# Patient Record
Sex: MALE | Race: WHITE | NOT HISPANIC OR LATINO | Employment: FULL TIME | URBAN - NONMETROPOLITAN AREA
[De-identification: names, ages, dates, MRNs, and addresses within clinical notes are randomized per-mention and may not be internally consistent; named-entity substitution may affect disease eponyms.]

---

## 2023-05-30 DIAGNOSIS — E78.00 HYPERCHOLESTEROLEMIA: ICD-10-CM

## 2023-05-30 PROBLEM — N20.1 URETER, CALCULUS: Status: ACTIVE | Noted: 2023-05-30

## 2023-05-30 PROBLEM — I25.10 CORONARY ARTERIOSCLEROSIS: Status: ACTIVE | Noted: 2023-05-30

## 2023-05-30 PROBLEM — I10 BENIGN HYPERTENSION: Status: ACTIVE | Noted: 2023-05-30

## 2023-05-30 PROBLEM — K21.00 GASTROESOPHAGEAL REFLUX DISEASE WITH ESOPHAGITIS WITHOUT HEMORRHAGE: Status: ACTIVE | Noted: 2023-05-30

## 2023-05-30 RX ORDER — ATORVASTATIN CALCIUM 40 MG/1
40 TABLET, FILM COATED ORAL DAILY
Qty: 90 TABLET | Refills: 0 | Status: SHIPPED | OUTPATIENT
Start: 2023-05-30 | End: 2023-08-30 | Stop reason: SDUPTHER

## 2023-05-30 RX ORDER — AMLODIPINE BESYLATE 10 MG/1
10 TABLET ORAL DAILY
COMMUNITY
End: 2023-07-05 | Stop reason: SDUPTHER

## 2023-05-30 RX ORDER — ATORVASTATIN CALCIUM 40 MG/1
40 TABLET, FILM COATED ORAL DAILY
COMMUNITY
End: 2023-05-30 | Stop reason: SDUPTHER

## 2023-05-30 RX ORDER — LOSARTAN POTASSIUM 50 MG/1
50 TABLET ORAL DAILY
COMMUNITY
End: 2023-07-05 | Stop reason: SDUPTHER

## 2023-07-05 DIAGNOSIS — I10 BENIGN HYPERTENSION: ICD-10-CM

## 2023-07-05 RX ORDER — AMLODIPINE BESYLATE 10 MG/1
10 TABLET ORAL DAILY
Qty: 180 TABLET | Refills: 0 | Status: SHIPPED | OUTPATIENT
Start: 2023-07-05 | End: 2023-12-29 | Stop reason: SDUPTHER

## 2023-07-05 RX ORDER — LOSARTAN POTASSIUM 50 MG/1
50 TABLET ORAL DAILY
Qty: 90 TABLET | Refills: 0 | Status: SHIPPED | OUTPATIENT
Start: 2023-07-05 | End: 2023-10-04 | Stop reason: SDUPTHER

## 2023-07-18 ENCOUNTER — OFFICE VISIT (OUTPATIENT)
Dept: PRIMARY CARE | Facility: CLINIC | Age: 58
End: 2023-07-18
Payer: COMMERCIAL

## 2023-07-18 VITALS
OXYGEN SATURATION: 97 % | BODY MASS INDEX: 28.73 KG/M2 | WEIGHT: 216.8 LBS | HEART RATE: 79 BPM | DIASTOLIC BLOOD PRESSURE: 80 MMHG | TEMPERATURE: 97.6 F | SYSTOLIC BLOOD PRESSURE: 128 MMHG | HEIGHT: 73 IN

## 2023-07-18 DIAGNOSIS — I10 BENIGN HYPERTENSION: ICD-10-CM

## 2023-07-18 DIAGNOSIS — E78.00 HYPERCHOLESTEROLEMIA: ICD-10-CM

## 2023-07-18 DIAGNOSIS — M77.8 DELTOID TENDINITIS OF RIGHT SHOULDER: ICD-10-CM

## 2023-07-18 DIAGNOSIS — M75.21 BICEPS TENDINITIS OF RIGHT UPPER EXTREMITY: Primary | ICD-10-CM

## 2023-07-18 PROCEDURE — 99214 OFFICE O/P EST MOD 30 MIN: CPT | Performed by: INTERNAL MEDICINE

## 2023-07-18 PROCEDURE — 3079F DIAST BP 80-89 MM HG: CPT | Performed by: INTERNAL MEDICINE

## 2023-07-18 PROCEDURE — 1036F TOBACCO NON-USER: CPT | Performed by: INTERNAL MEDICINE

## 2023-07-18 PROCEDURE — 3074F SYST BP LT 130 MM HG: CPT | Performed by: INTERNAL MEDICINE

## 2023-07-18 PROCEDURE — 20550 NJX 1 TENDON SHEATH/LIGAMENT: CPT | Performed by: INTERNAL MEDICINE

## 2023-07-18 RX ORDER — TRIAMCINOLONE ACETONIDE 40 MG/ML
40 INJECTION, SUSPENSION INTRA-ARTICULAR; INTRAMUSCULAR ONCE
Status: COMPLETED | OUTPATIENT
Start: 2023-07-18 | End: 2023-07-18

## 2023-07-18 RX ADMIN — TRIAMCINOLONE ACETONIDE 40 MG: 40 INJECTION, SUSPENSION INTRA-ARTICULAR; INTRAMUSCULAR at 16:21

## 2023-07-18 RX ADMIN — TRIAMCINOLONE ACETONIDE 40 MG: 40 INJECTION, SUSPENSION INTRA-ARTICULAR; INTRAMUSCULAR at 16:23

## 2023-07-18 ASSESSMENT — ENCOUNTER SYMPTOMS
FATIGUE: 0
BLOOD IN STOOL: 0
DEPRESSION: 0
ARTHRALGIAS: 1
HYPERTENSION: 1
LOSS OF SENSATION IN FEET: 0
PALPITATIONS: 0
DIARRHEA: 0
COUGH: 0
UNEXPECTED WEIGHT CHANGE: 0
BRUISES/BLEEDS EASILY: 0
DIFFICULTY URINATING: 0
FEVER: 0
PAIN: 1
SINUS PAIN: 0
MYALGIAS: 1
WHEEZING: 0
ABDOMINAL PAIN: 0
DIZZINESS: 0
HEADACHES: 0
SORE THROAT: 0
OCCASIONAL FEELINGS OF UNSTEADINESS: 0

## 2023-07-18 ASSESSMENT — ANXIETY QUESTIONNAIRES
1. FEELING NERVOUS, ANXIOUS, OR ON EDGE: NOT AT ALL
2. NOT BEING ABLE TO STOP OR CONTROL WORRYING: NOT AT ALL
7. FEELING AFRAID AS IF SOMETHING AWFUL MIGHT HAPPEN: NOT AT ALL
3. WORRYING TOO MUCH ABOUT DIFFERENT THINGS: NOT AT ALL
GAD7 TOTAL SCORE: 0
4. TROUBLE RELAXING: NOT AT ALL
5. BEING SO RESTLESS THAT IT IS HARD TO SIT STILL: NOT AT ALL
6. BECOMING EASILY ANNOYED OR IRRITABLE: NOT AT ALL

## 2023-07-18 ASSESSMENT — PATIENT HEALTH QUESTIONNAIRE - PHQ9
SUM OF ALL RESPONSES TO PHQ9 QUESTIONS 1 AND 2: 0
2. FEELING DOWN, DEPRESSED OR HOPELESS: NOT AT ALL
1. LITTLE INTEREST OR PLEASURE IN DOING THINGS: NOT AT ALL

## 2023-07-18 NOTE — PROGRESS NOTES
"Subjective   Patient ID: Av Francisco is a 58 y.o. male who presents for Annual Exam, Hyperlipidemia, Hypertension, GERD, Pain (Rt buttock to foot with numbness, especially when driving ), Shoulder Pain (Rt shoulder, requests cortisone inj), and lesion on side of face (Right side).    --Right-sided facial skin lesion, skin keratosis patient reassured continue monitoring call if any increase in size or worsening  -Right shoulder pain recurrent patient had previously steroid injection which helped with his pain  Right bicipital tendinitis steroid injection today  Right deltoid tendinitis, steroid injection today follow-up results  - Hyperlipidemia improving  -Cardiac calcium scoring mildly calcium. Maximize medical management  Weight loss exercise patient denies any chest pain or shortness of breath continue with aggressive medical management  -\"Reflux disease continue with conservative measures continue current treatment  -Hypertension controlled continue current medication  -Continue exercise and weight loss  Follow-up 6 months       Hyperlipidemia  Associated symptoms include myalgias. Pertinent negatives include no chest pain.   Hypertension  Pertinent negatives include no chest pain, headaches or palpitations.   GERD  He reports no abdominal pain, no chest pain, no coughing, no sore throat or no wheezing. Pertinent negatives include no fatigue.   Pain  Pertinent negatives include no abdominal pain, chest pain, diarrhea, fatigue, fever, headaches, rash or wheezing.   Shoulder Pain   Pertinent negatives include no fever.          Review of Systems   Constitutional:  Negative for fatigue, fever and unexpected weight change.   HENT:  Negative for congestion, ear discharge, ear pain, mouth sores, sinus pain and sore throat.    Eyes:  Negative for visual disturbance.   Respiratory:  Negative for cough and wheezing.    Cardiovascular:  Negative for chest pain, palpitations and leg swelling.   Gastrointestinal:  " "Negative for abdominal pain, blood in stool and diarrhea.   Genitourinary:  Negative for difficulty urinating.   Musculoskeletal:  Positive for arthralgias and myalgias.   Skin:  Negative for rash.   Neurological:  Negative for dizziness and headaches.   Hematological:  Does not bruise/bleed easily.   Psychiatric/Behavioral:  Negative for behavioral problems.    All other systems reviewed and are negative.      Objective   No results found for: \"HGBA1C\"   /80   Pulse 79   Temp 36.4 °C (97.6 °F)   Ht 1.854 m (6' 1\")   Wt 98.3 kg (216 lb 12.8 oz)   SpO2 97%   BMI 28.60 kg/m²   Lab Results   Component Value Date    WBC 7.5 01/04/2023    HGB 16.4 01/04/2023    HCT 48.3 01/04/2023     01/04/2023    CHOL 185 01/04/2023    TRIG 161 (H) 01/04/2023    HDL 52.0 01/04/2023    ALT 34 01/04/2023    AST 22 01/04/2023     01/04/2023    K 3.9 01/04/2023     01/04/2023    CREATININE 0.81 01/04/2023    BUN 12 01/04/2023    CO2 24 01/04/2023    TSH 1.57 01/04/2023     par   Physical Exam  Vitals and nursing note reviewed.   Constitutional:       Appearance: Normal appearance.   HENT:      Head: Normocephalic.      Nose: Nose normal.   Eyes:      Conjunctiva/sclera: Conjunctivae normal.      Pupils: Pupils are equal, round, and reactive to light.   Cardiovascular:      Rate and Rhythm: Regular rhythm.   Pulmonary:      Effort: Pulmonary effort is normal.      Breath sounds: Normal breath sounds.   Abdominal:      General: Abdomen is flat.      Palpations: Abdomen is soft.   Musculoskeletal:         General: Tenderness (Right bicipital tendinitis and right deltoid tendinitis) present.      Cervical back: Neck supple.   Skin:     General: Skin is warm.   Neurological:      General: No focal deficit present.      Mental Status: He is oriented to person, place, and time.   Psychiatric:         Mood and Affect: Mood normal.     Procedure note  1.  Right bicipital tendon injection  After patient verbal consent, " "risks and benefits of procedure explained, including infection bleeding, patient understood  Ethyl alcohol used for sterilization  Ethyl chloride spray used for  local anesthesia  40 mg Kenalog  and 1 cc lidocaine injected to   right bicipital groove           , used  1 inch needle  Patient tolerated procedure,   No complications   Band aid used for dressing  Patient instructed no heavy exertion or exercise for the next 48 hours    2 procedure note  Right deltoid muscle tendon injection  After patient verbal consent, risks and benefits of procedure explained, including infection bleeding, patient understood  Ethyl alcohol used for sterilization  Ethyl chloride spray used for  local anesthesia  40 mg Kenalog  and 1 cc lidocaine injected to   right will-deltoid tendon           , used  1 inch needle  Patient tolerated procedure,   No complications   Band aid used for dressing  Patient instructed no heavy exertion or exercise for the next 48 hours    Assessment/Plan   Av was seen today for annual exam, hyperlipidemia, hypertension, gerd, pain, shoulder pain and lesion on side of face.  Diagnoses and all orders for this visit:  Biceps tendinitis of right upper extremity (Primary)  -     triamcinolone acetonide (Kenalog-40) injection 40 mg  Benign hypertension  Deltoid tendinitis of right shoulder  Hypercholesterolemia   Right-sided facial skin lesion, skin keratosis patient reassured continue monitoring call if any increase in size or worsening  -Right shoulder pain recurrent patient had previously steroid injection which helped with his pain  Right bicipital tendinitis steroid injection today  Right deltoid tendinitis, steroid injection today follow-up results  - Hyperlipidemia improving  -Cardiac calcium scoring mildly calcium. Maximize medical management  Weight loss exercise patient denies any chest pain or shortness of breath continue with aggressive medical management  -\"Reflux disease continue with " conservative measures continue current treatment  -Hypertension controlled continue current medication  -Continue exercise and weight loss  Follow-up 6 months

## 2023-08-30 DIAGNOSIS — E78.00 HYPERCHOLESTEROLEMIA: ICD-10-CM

## 2023-08-30 RX ORDER — ATORVASTATIN CALCIUM 40 MG/1
40 TABLET, FILM COATED ORAL DAILY
Qty: 90 TABLET | Refills: 0 | Status: SHIPPED | OUTPATIENT
Start: 2023-08-30 | End: 2023-11-20 | Stop reason: SDUPTHER

## 2023-10-04 DIAGNOSIS — I10 BENIGN HYPERTENSION: ICD-10-CM

## 2023-10-04 RX ORDER — LOSARTAN POTASSIUM 50 MG/1
50 TABLET ORAL DAILY
Qty: 90 TABLET | Refills: 0 | Status: SHIPPED | OUTPATIENT
Start: 2023-10-04 | End: 2023-12-29 | Stop reason: SDUPTHER

## 2023-11-20 DIAGNOSIS — E78.00 HYPERCHOLESTEROLEMIA: ICD-10-CM

## 2023-11-20 RX ORDER — ATORVASTATIN CALCIUM 40 MG/1
40 TABLET, FILM COATED ORAL DAILY
Qty: 90 TABLET | Refills: 1 | Status: SHIPPED | OUTPATIENT
Start: 2023-11-20 | End: 2024-05-29 | Stop reason: SDUPTHER

## 2023-12-29 DIAGNOSIS — I10 BENIGN HYPERTENSION: ICD-10-CM

## 2023-12-29 RX ORDER — LOSARTAN POTASSIUM 50 MG/1
50 TABLET ORAL DAILY
Qty: 90 TABLET | Refills: 0 | Status: SHIPPED | OUTPATIENT
Start: 2023-12-29 | End: 2024-04-03 | Stop reason: SDUPTHER

## 2023-12-29 RX ORDER — AMLODIPINE BESYLATE 10 MG/1
10 TABLET ORAL DAILY
Qty: 180 TABLET | Refills: 0 | Status: SHIPPED | OUTPATIENT
Start: 2023-12-29 | End: 2024-04-03 | Stop reason: SDUPTHER

## 2024-01-22 ENCOUNTER — OFFICE VISIT (OUTPATIENT)
Dept: PRIMARY CARE | Facility: CLINIC | Age: 59
End: 2024-01-22
Payer: COMMERCIAL

## 2024-01-22 VITALS
DIASTOLIC BLOOD PRESSURE: 74 MMHG | SYSTOLIC BLOOD PRESSURE: 110 MMHG | BODY MASS INDEX: 28.37 KG/M2 | WEIGHT: 215 LBS | TEMPERATURE: 97.9 F | OXYGEN SATURATION: 96 % | HEART RATE: 68 BPM

## 2024-01-22 DIAGNOSIS — H60.393 OTHER INFECTIVE OTITIS EXTERNA OF BOTH EARS, UNSPECIFIED CHRONICITY: Primary | ICD-10-CM

## 2024-01-22 DIAGNOSIS — I10 BENIGN HYPERTENSION: ICD-10-CM

## 2024-01-22 DIAGNOSIS — E78.00 HYPERCHOLESTEROLEMIA: ICD-10-CM

## 2024-01-22 DIAGNOSIS — L98.9 SKIN LESION OF FACE: ICD-10-CM

## 2024-01-22 DIAGNOSIS — Z00.00 ANNUAL PHYSICAL EXAM: ICD-10-CM

## 2024-01-22 PROCEDURE — 3074F SYST BP LT 130 MM HG: CPT | Performed by: INTERNAL MEDICINE

## 2024-01-22 PROCEDURE — 1036F TOBACCO NON-USER: CPT | Performed by: INTERNAL MEDICINE

## 2024-01-22 PROCEDURE — 3078F DIAST BP <80 MM HG: CPT | Performed by: INTERNAL MEDICINE

## 2024-01-22 PROCEDURE — 99214 OFFICE O/P EST MOD 30 MIN: CPT | Performed by: INTERNAL MEDICINE

## 2024-01-22 RX ORDER — NEOMYCIN SULFATE, POLYMYXIN B SULFATE, HYDROCORTISONE 3.5; 10000; 1 MG/ML; [USP'U]/ML; MG/ML
3 SOLUTION/ DROPS AURICULAR (OTIC) 4 TIMES DAILY
Qty: 4.2 ML | Refills: 0 | Status: SHIPPED | OUTPATIENT
Start: 2024-01-22 | End: 2024-01-29

## 2024-01-22 ASSESSMENT — ENCOUNTER SYMPTOMS
ABDOMINAL PAIN: 0
DIZZINESS: 0
PALPITATIONS: 0
SORE THROAT: 0
WHEEZING: 0
DIFFICULTY URINATING: 0
UNEXPECTED WEIGHT CHANGE: 0
HYPERTENSION: 1
BRUISES/BLEEDS EASILY: 0
BLOOD IN STOOL: 0
HEADACHES: 0
ARTHRALGIAS: 0
COLOR CHANGE: 1
DIARRHEA: 0
FEVER: 0
SINUS PAIN: 0
COUGH: 0
FATIGUE: 0

## 2024-01-22 NOTE — PROGRESS NOTES
"Subjective   Patient ID: Av Francisco is a 58 y.o. male who presents for Hyperlipidemia, Hypertension, spots (Spots on head itching), and Ear Fullness (Whatever ear he is laying on in am seems to drain, earache in am when happens).    -Bilateral ear fullness examination bilateral otitis externa  Patient counseled about prevention use Neosporin polymyxin HC patient instructed about the use and side effect follow-up if no improvement  - Right-sided facial skin lesion now itching and getting bigger in size also multiple lesions right arm  - Status post right biceps tendinitis resolved after history of steroid injection  - Hyperlipidemia improving  -Cardiac calcium scoring mildly calcium. Maximize medical management  Weight loss exercise patient denies any chest pain or shortness of breath continue with aggressive medical management  -\"Reflux disease continue with conservative measures continue current treatment  -Hypertension controlled continue current medication  -Continue exercise and weight loss  Follow-up 6 months needs complete blood work before next appointment         Hyperlipidemia  Pertinent negatives include no chest pain.   Hypertension  Pertinent negatives include no chest pain, headaches or palpitations.   Ear Fullness   Associated symptoms include ear discharge. Pertinent negatives include no abdominal pain, coughing, diarrhea, headaches, rash or sore throat.          Review of Systems   Constitutional:  Negative for fatigue, fever and unexpected weight change.   HENT:  Positive for ear discharge. Negative for congestion, ear pain, mouth sores, sinus pain and sore throat.    Eyes:  Negative for visual disturbance.   Respiratory:  Negative for cough and wheezing.    Cardiovascular:  Negative for chest pain, palpitations and leg swelling.   Gastrointestinal:  Negative for abdominal pain, blood in stool and diarrhea.   Genitourinary:  Negative for difficulty urinating.   Musculoskeletal:  Negative for " "arthralgias.   Skin:  Positive for color change. Negative for rash.   Neurological:  Negative for dizziness and headaches.   Hematological:  Does not bruise/bleed easily.   Psychiatric/Behavioral:  Negative for behavioral problems.    All other systems reviewed and are negative.      Objective   No results found for: \"HGBA1C\"   /74   Pulse 68   Temp 36.6 °C (97.9 °F)   Wt 97.5 kg (215 lb)   SpO2 96%   BMI 28.37 kg/m²   Lab Results   Component Value Date    WBC 7.5 01/04/2023    HGB 16.4 01/04/2023    HCT 48.3 01/04/2023     01/04/2023    CHOL 185 01/04/2023    TRIG 161 (H) 01/04/2023    HDL 52.0 01/04/2023    ALT 34 01/04/2023    AST 22 01/04/2023     01/04/2023    K 3.9 01/04/2023     01/04/2023    CREATININE 0.81 01/04/2023    BUN 12 01/04/2023    CO2 24 01/04/2023    TSH 1.57 01/04/2023     par   Physical Exam  Vitals and nursing note reviewed.   Constitutional:       Appearance: Normal appearance.   HENT:      Head: Normocephalic.      Nose: Nose normal.   Eyes:      Conjunctiva/sclera: Conjunctivae normal.      Pupils: Pupils are equal, round, and reactive to light.   Cardiovascular:      Rate and Rhythm: Regular rhythm.   Pulmonary:      Effort: Pulmonary effort is normal.      Breath sounds: Normal breath sounds.   Abdominal:      General: Abdomen is flat.      Palpations: Abdomen is soft.   Musculoskeletal:      Cervical back: Neck supple.   Skin:     General: Skin is warm.      Findings: Lesion (Right-sided facial seborrheic hyperkeratosis) present.   Neurological:      General: No focal deficit present.      Mental Status: He is oriented to person, place, and time.   Psychiatric:         Mood and Affect: Mood normal.         Assessment/Plan   Av was seen today for hyperlipidemia, hypertension, spots and ear fullness.  Diagnoses and all orders for this visit:  Other infective otitis externa of both ears, unspecified chronicity (Primary)  -     neomycin-polymyxin-HC " "(Cortisporin) otic solution; Administer 3 drops into each ear 4 times a day for 7 days.  Skin lesion of face  -     Referral to Dermatology  Benign hypertension  Hypercholesterolemia  Annual physical exam  -     CBC and Auto Differential; Future  -     Comprehensive Metabolic Panel; Future  -     Lipid Panel; Future  -     Prostate Specific Antigen; Future  -     TSH with reflex to Free T4 if abnormal; Future  Other orders  -     Follow Up In Primary Care - Health Maintenance; Future   -Bilateral ear fullness examination bilateral otitis externa  Patient counseled about prevention use Neosporin polymyxin HC patient instructed about the use and side effect follow-up if no improvement  - Right-sided facial skin lesion now itching and getting bigger in size also multiple lesions right arm  - Status post right biceps tendinitis resolved after history of steroid injection  - Hyperlipidemia improving  -Cardiac calcium scoring mildly calcium. Maximize medical management  Weight loss exercise patient denies any chest pain or shortness of breath continue with aggressive medical management  -\"Reflux disease continue with conservative measures continue current treatment  -Hypertension controlled continue current medication  -Continue exercise and weight loss  Follow-up 6 months needs complete blood work before next appointment       "

## 2024-04-03 DIAGNOSIS — I10 BENIGN HYPERTENSION: ICD-10-CM

## 2024-04-03 RX ORDER — AMLODIPINE BESYLATE 10 MG/1
10 TABLET ORAL DAILY
Qty: 90 TABLET | Refills: 0 | Status: SHIPPED | OUTPATIENT
Start: 2024-04-03

## 2024-04-03 RX ORDER — LOSARTAN POTASSIUM 50 MG/1
50 TABLET ORAL DAILY
Qty: 90 TABLET | Refills: 0 | Status: SHIPPED | OUTPATIENT
Start: 2024-04-03

## 2024-05-29 DIAGNOSIS — E78.00 HYPERCHOLESTEROLEMIA: ICD-10-CM

## 2024-05-29 RX ORDER — ATORVASTATIN CALCIUM 40 MG/1
40 TABLET, FILM COATED ORAL DAILY
Qty: 90 TABLET | Refills: 1 | Status: SHIPPED | OUTPATIENT
Start: 2024-05-29

## 2024-06-26 DIAGNOSIS — I10 BENIGN HYPERTENSION: ICD-10-CM

## 2024-06-26 RX ORDER — AMLODIPINE BESYLATE 10 MG/1
10 TABLET ORAL DAILY
Qty: 90 TABLET | Refills: 0 | Status: SHIPPED | OUTPATIENT
Start: 2024-06-26

## 2024-06-26 RX ORDER — LOSARTAN POTASSIUM 50 MG/1
50 TABLET ORAL DAILY
Qty: 90 TABLET | Refills: 0 | Status: SHIPPED | OUTPATIENT
Start: 2024-06-26

## 2024-07-18 ENCOUNTER — LAB (OUTPATIENT)
Dept: LAB | Facility: LAB | Age: 59
End: 2024-07-18
Payer: COMMERCIAL

## 2024-07-18 DIAGNOSIS — Z00.00 ANNUAL PHYSICAL EXAM: ICD-10-CM

## 2024-07-18 LAB
ALBUMIN SERPL BCP-MCNC: 4.3 G/DL (ref 3.4–5)
ALP SERPL-CCNC: 55 U/L (ref 33–136)
ALT SERPL W P-5'-P-CCNC: 26 U/L (ref 10–52)
ANION GAP SERPL CALC-SCNC: 12 MMOL/L (ref 10–20)
AST SERPL W P-5'-P-CCNC: 17 U/L (ref 9–39)
BASOPHILS # BLD AUTO: 0.07 X10*3/UL (ref 0–0.1)
BASOPHILS NFR BLD AUTO: 0.9 %
BILIRUB SERPL-MCNC: 1.1 MG/DL (ref 0–1.2)
BUN SERPL-MCNC: 14 MG/DL (ref 6–23)
CALCIUM SERPL-MCNC: 9.1 MG/DL (ref 8.6–10.3)
CHLORIDE SERPL-SCNC: 107 MMOL/L (ref 98–107)
CHOLEST SERPL-MCNC: 168 MG/DL (ref 0–199)
CHOLESTEROL/HDL RATIO: 3.2
CO2 SERPL-SCNC: 24 MMOL/L (ref 21–32)
CREAT SERPL-MCNC: 0.85 MG/DL (ref 0.5–1.3)
EGFRCR SERPLBLD CKD-EPI 2021: >90 ML/MIN/1.73M*2
EOSINOPHIL # BLD AUTO: 0.32 X10*3/UL (ref 0–0.7)
EOSINOPHIL NFR BLD AUTO: 4 %
ERYTHROCYTE [DISTWIDTH] IN BLOOD BY AUTOMATED COUNT: 12.1 % (ref 11.5–14.5)
GLUCOSE SERPL-MCNC: 96 MG/DL (ref 74–99)
HCT VFR BLD AUTO: 45.2 % (ref 41–52)
HDLC SERPL-MCNC: 52.2 MG/DL
HGB BLD-MCNC: 15.1 G/DL (ref 13.5–17.5)
IMM GRANULOCYTES # BLD AUTO: 0.03 X10*3/UL (ref 0–0.7)
IMM GRANULOCYTES NFR BLD AUTO: 0.4 % (ref 0–0.9)
LDLC SERPL CALC-MCNC: 92 MG/DL
LYMPHOCYTES # BLD AUTO: 2.13 X10*3/UL (ref 1.2–4.8)
LYMPHOCYTES NFR BLD AUTO: 26.5 %
MCH RBC QN AUTO: 30.1 PG (ref 26–34)
MCHC RBC AUTO-ENTMCNC: 33.4 G/DL (ref 32–36)
MCV RBC AUTO: 90 FL (ref 80–100)
MONOCYTES # BLD AUTO: 0.56 X10*3/UL (ref 0.1–1)
MONOCYTES NFR BLD AUTO: 7 %
NEUTROPHILS # BLD AUTO: 4.92 X10*3/UL (ref 1.2–7.7)
NEUTROPHILS NFR BLD AUTO: 61.2 %
NON HDL CHOLESTEROL: 116 MG/DL (ref 0–149)
NRBC BLD-RTO: 0 /100 WBCS (ref 0–0)
PLATELET # BLD AUTO: 203 X10*3/UL (ref 150–450)
POTASSIUM SERPL-SCNC: 3.8 MMOL/L (ref 3.5–5.3)
PROT SERPL-MCNC: 6.8 G/DL (ref 6.4–8.2)
RBC # BLD AUTO: 5.01 X10*6/UL (ref 4.5–5.9)
SODIUM SERPL-SCNC: 139 MMOL/L (ref 136–145)
TRIGL SERPL-MCNC: 121 MG/DL (ref 0–149)
TSH SERPL-ACNC: 1.09 MIU/L (ref 0.44–3.98)
VLDL: 24 MG/DL (ref 0–40)
WBC # BLD AUTO: 8 X10*3/UL (ref 4.4–11.3)

## 2024-07-18 PROCEDURE — 36415 COLL VENOUS BLD VENIPUNCTURE: CPT

## 2024-07-18 PROCEDURE — 84153 ASSAY OF PSA TOTAL: CPT

## 2024-07-19 LAB — PSA SERPL-MCNC: 0.83 NG/ML

## 2024-07-22 ENCOUNTER — APPOINTMENT (OUTPATIENT)
Dept: PRIMARY CARE | Facility: CLINIC | Age: 59
End: 2024-07-22
Payer: COMMERCIAL

## 2024-07-22 VITALS
WEIGHT: 217.6 LBS | HEART RATE: 81 BPM | TEMPERATURE: 97.3 F | SYSTOLIC BLOOD PRESSURE: 122 MMHG | BODY MASS INDEX: 28.71 KG/M2 | DIASTOLIC BLOOD PRESSURE: 84 MMHG | OXYGEN SATURATION: 97 %

## 2024-07-22 DIAGNOSIS — M54.2 CERVICALGIA: ICD-10-CM

## 2024-07-22 DIAGNOSIS — Z00.00 ANNUAL PHYSICAL EXAM: Primary | ICD-10-CM

## 2024-07-22 DIAGNOSIS — M75.81 TENDINITIS OF RIGHT ROTATOR CUFF: ICD-10-CM

## 2024-07-22 DIAGNOSIS — E78.00 HYPERCHOLESTEROLEMIA: ICD-10-CM

## 2024-07-22 DIAGNOSIS — M26.641 ARTHRITIS OF RIGHT TEMPOROMANDIBULAR JOINT: ICD-10-CM

## 2024-07-22 DIAGNOSIS — I10 BENIGN HYPERTENSION: ICD-10-CM

## 2024-07-22 PROCEDURE — 1036F TOBACCO NON-USER: CPT | Performed by: INTERNAL MEDICINE

## 2024-07-22 PROCEDURE — 99396 PREV VISIT EST AGE 40-64: CPT | Performed by: INTERNAL MEDICINE

## 2024-07-22 PROCEDURE — 3079F DIAST BP 80-89 MM HG: CPT | Performed by: INTERNAL MEDICINE

## 2024-07-22 PROCEDURE — 99214 OFFICE O/P EST MOD 30 MIN: CPT | Performed by: INTERNAL MEDICINE

## 2024-07-22 PROCEDURE — 3074F SYST BP LT 130 MM HG: CPT | Performed by: INTERNAL MEDICINE

## 2024-07-22 RX ORDER — MELOXICAM 15 MG/1
15 TABLET ORAL NIGHTLY
Qty: 30 TABLET | Refills: 0 | Status: SHIPPED | OUTPATIENT
Start: 2024-07-22 | End: 2024-08-21

## 2024-07-22 ASSESSMENT — ANXIETY QUESTIONNAIRES
5. BEING SO RESTLESS THAT IT IS HARD TO SIT STILL: NOT AT ALL
GAD7 TOTAL SCORE: 3
1. FEELING NERVOUS, ANXIOUS, OR ON EDGE: NOT AT ALL
7. FEELING AFRAID AS IF SOMETHING AWFUL MIGHT HAPPEN: NOT AT ALL
IF YOU CHECKED OFF ANY PROBLEMS ON THIS QUESTIONNAIRE, HOW DIFFICULT HAVE THESE PROBLEMS MADE IT FOR YOU TO DO YOUR WORK, TAKE CARE OF THINGS AT HOME, OR GET ALONG WITH OTHER PEOPLE: NOT DIFFICULT AT ALL
2. NOT BEING ABLE TO STOP OR CONTROL WORRYING: NOT AT ALL
4. TROUBLE RELAXING: NEARLY EVERY DAY
3. WORRYING TOO MUCH ABOUT DIFFERENT THINGS: NOT AT ALL
6. BECOMING EASILY ANNOYED OR IRRITABLE: NOT AT ALL

## 2024-07-22 ASSESSMENT — ENCOUNTER SYMPTOMS
HEADACHES: 0
BRUISES/BLEEDS EASILY: 0
NECK PAIN: 1
ARTHRALGIAS: 1
UNEXPECTED WEIGHT CHANGE: 0
PALPITATIONS: 0
FATIGUE: 0
BLOOD IN STOOL: 0
WHEEZING: 0
ABDOMINAL PAIN: 0
DIFFICULTY URINATING: 0
COUGH: 0
DIZZINESS: 0
DIARRHEA: 0
JOINT SWELLING: 1
SORE THROAT: 0
FEVER: 0
SINUS PAIN: 0

## 2024-07-22 ASSESSMENT — PATIENT HEALTH QUESTIONNAIRE - PHQ9
SUM OF ALL RESPONSES TO PHQ9 QUESTIONS 1 AND 2: 0
1. LITTLE INTEREST OR PLEASURE IN DOING THINGS: NOT AT ALL
2. FEELING DOWN, DEPRESSED OR HOPELESS: NOT AT ALL

## 2024-07-22 NOTE — PROGRESS NOTES
"Subjective   Patient ID: Av Francisco is a 59 y.o. male who presents for Annual Exam (Physical. ).    Annual preventive visit  - Vaccinations reviewed and up-to-date  - Screen for colon cancer obtained in 2015 repeat in 10 years 2025  - Screen for depression negative-Bilateral ear fullness examination bilateral otitis externa  -Cardiac calcium scoring mildly calcium. Maximize medical management.    Follow-up  - Recent blood work reviewed with patient controlled continue low-fat diet exercise and weight loss  -Right shoulder tendinitis not improving refer patient to Ortho  -Chronic neck pain on the right side with muscle spasm counseled on stretching exercises start meloxicam 15 mg daily avoiding lifting  -Right-sided jaw pain right-sided TMJ arthritis patient underlying history of grinding teeth comes about mouthguard discussed with his dentist next appointment continue with meloxicam for 1 months follow-up if no improvement  --\"Reflux disease continue with conservative measures continue current treatment  -Hypertension controlled continue current medication  - Hypercholesteremia controlled continue current medication continue low-fat diet   -Continue exercise and weight loss  Follow-up 3 months           Review of Systems   Constitutional:  Negative for fatigue, fever and unexpected weight change.   HENT:  Negative for congestion, ear discharge, ear pain, mouth sores, sinus pain and sore throat.    Eyes:  Negative for visual disturbance.   Respiratory:  Negative for cough and wheezing.    Cardiovascular:  Negative for chest pain, palpitations and leg swelling.   Gastrointestinal:  Negative for abdominal pain, blood in stool and diarrhea.   Genitourinary:  Negative for difficulty urinating.   Musculoskeletal:  Positive for arthralgias, joint swelling and neck pain.   Skin:  Negative for rash.   Neurological:  Negative for dizziness and headaches.   Hematological:  Does not bruise/bleed easily. " "  Psychiatric/Behavioral:  Negative for behavioral problems.    All other systems reviewed and are negative.      Objective   No results found for: \"HGBA1C\"   /84   Pulse 81   Temp 36.3 °C (97.3 °F)   Wt 98.7 kg (217 lb 9.6 oz)   SpO2 97%   BMI 28.71 kg/m²   Lab Results   Component Value Date    WBC 8.0 07/18/2024    HGB 15.1 07/18/2024    HCT 45.2 07/18/2024     07/18/2024    CHOL 168 07/18/2024    TRIG 121 07/18/2024    HDL 52.2 07/18/2024    ALT 26 07/18/2024    AST 17 07/18/2024     07/18/2024    K 3.8 07/18/2024     07/18/2024    CREATININE 0.85 07/18/2024    BUN 14 07/18/2024    CO2 24 07/18/2024    TSH 1.09 07/18/2024    PSA 0.83 07/18/2024     par   Physical Exam  Vitals and nursing note reviewed.   Constitutional:       Appearance: Normal appearance.   HENT:      Head: Normocephalic.      Nose: Nose normal.   Eyes:      Conjunctiva/sclera: Conjunctivae normal.      Pupils: Pupils are equal, round, and reactive to light.   Cardiovascular:      Rate and Rhythm: Regular rhythm.   Pulmonary:      Effort: Pulmonary effort is normal.      Breath sounds: Normal breath sounds.   Abdominal:      General: Abdomen is flat.      Palpations: Abdomen is soft.   Musculoskeletal:         General: Swelling (Right shoulder tendinitis) and tenderness (Right TMJ arthritis also tenderness in the right paravertebral cervical muscles) present.      Cervical back: Neck supple.      Right lower leg: No edema.      Left lower leg: No edema.   Skin:     General: Skin is warm.   Neurological:      General: No focal deficit present.      Mental Status: He is oriented to person, place, and time.   Psychiatric:         Mood and Affect: Mood normal.         Assessment/Plan   Av was seen today for annual exam.  Diagnoses and all orders for this visit:  Annual physical exam (Primary)  Tendinitis of right rotator cuff  -     Referral to Orthopaedic Surgery; Future  Cervicalgia  -     meloxicam (Mobic) 15 mg " "tablet; Take 1 tablet (15 mg) by mouth once daily at bedtime.  Arthritis of right temporomandibular joint  -     meloxicam (Mobic) 15 mg tablet; Take 1 tablet (15 mg) by mouth once daily at bedtime.  Benign hypertension  Hypercholesterolemia  Other orders  -     Follow Up In Primary Care - Health Maintenance  -     Follow Up In Primary Care - Established; Future   Annual preventive visit  - Vaccinations reviewed and up-to-date  - Screen for colon cancer obtained in 2015 repeat in 10 years 2025  - Screen for depression negative-Bilateral ear fullness examination bilateral otitis externa  -Cardiac calcium scoring mildly calcium. Maximize medical management.    Follow-up  - Recent blood work reviewed with patient controlled continue low-fat diet exercise and weight loss  -Right shoulder tendinitis not improving refer patient to Ortho  -Chronic neck pain on the right side with muscle spasm counseled on stretching exercises start meloxicam 15 mg daily avoiding lifting  -Right-sided jaw pain right-sided TMJ arthritis patient underlying history of grinding teeth comes about mouthguard discussed with his dentist next appointment continue with meloxicam for 1 months follow-up if no improvement  --\"Reflux disease continue with conservative measures continue current treatment  -Hypertension controlled continue current medication  - Hypercholesteremia controlled continue current medication continue low-fat diet   -Continue exercise and weight loss  Follow-up 3 months  " [___ Month(s)] : [unfilled] month(s) [FreeTextEntry1] : The patient is a 57-year-old postmenopausal female family history of CAD, asthma, esophageal reflux, hyperlipidemia, h/o respiratory failure secondary to throat cyst. in the past. \par #1 CV- + FH with negative stress and echo\par #2 Lipids- check today\par #3 GI- GERD, needs EGD, concern over throat cyst\par #4 Asthma- stable on monteleukast

## 2024-08-26 ENCOUNTER — APPOINTMENT (OUTPATIENT)
Dept: ORTHOPEDIC SURGERY | Facility: CLINIC | Age: 59
End: 2024-08-26
Payer: COMMERCIAL

## 2024-08-26 ENCOUNTER — HOSPITAL ENCOUNTER (OUTPATIENT)
Dept: RADIOLOGY | Facility: CLINIC | Age: 59
Discharge: HOME | End: 2024-08-26
Payer: COMMERCIAL

## 2024-08-26 DIAGNOSIS — M25.511 RIGHT SHOULDER PAIN, UNSPECIFIED CHRONICITY: ICD-10-CM

## 2024-08-26 DIAGNOSIS — M75.41 SHOULDER IMPINGEMENT SYNDROME, RIGHT: Primary | ICD-10-CM

## 2024-08-26 PROCEDURE — 99203 OFFICE O/P NEW LOW 30 MIN: CPT | Performed by: ORTHOPAEDIC SURGERY

## 2024-08-26 PROCEDURE — 73030 X-RAY EXAM OF SHOULDER: CPT | Mod: RIGHT SIDE | Performed by: RADIOLOGY

## 2024-08-26 PROCEDURE — 73030 X-RAY EXAM OF SHOULDER: CPT | Mod: RT

## 2024-08-26 PROCEDURE — 1036F TOBACCO NON-USER: CPT | Performed by: ORTHOPAEDIC SURGERY

## 2024-08-26 ASSESSMENT — PAIN SCALES - GENERAL: PAINLEVEL_OUTOF10: 8

## 2024-08-26 ASSESSMENT — PAIN - FUNCTIONAL ASSESSMENT: PAIN_FUNCTIONAL_ASSESSMENT: 0-10

## 2024-08-27 ASSESSMENT — ENCOUNTER SYMPTOMS
FATIGUE: 0
ARTHRALGIAS: 1
CHILLS: 0
SHORTNESS OF BREATH: 0
FEVER: 0
TROUBLE SWALLOWING: 0
WHEEZING: 0

## 2024-08-27 NOTE — PROGRESS NOTES
Reason for Appointment  Chief Complaint   Patient presents with    Right Shoulder - Pain     History of Present Illness  New patient is a 59 y.o. male here today for evaluation of his right shoulder.  He has had shoulder pain for the last few years, he has had multiple injections done by his primary care physician, most recent injection just over a year ago did not give him any significant relief.  He continues to have lateral and anterior shoulder pain, difficult to sleep at night and do any lifting.  X-rays taken today of the shoulder are reviewed and show moderate AC joint DJD.  No numbness or tingling down the arm.  He has not done any formal therapy.    Past Medical History:   Diagnosis Date    Essential (primary) hypertension     Benign essential HTN    Joint disorder, unspecified     Wrist disorder       Past Surgical History:   Procedure Laterality Date    APPENDECTOMY  2015    Appendectomy    OTHER SURGICAL HISTORY  2015    Wrist Surgery    TONSILLECTOMY  2015    Tonsillectomy       Medication Documentation Review Audit       Reviewed by Lexi Pereira PA-C (Physician Assistant) on 24 at 0855      Medication Order Taking? Sig Documenting Provider Last Dose Status   amLODIPine (Norvasc) 10 mg tablet [id number] Yes Take 1 tablet (10 mg) by mouth once daily. Marielle White MD Taking Active   atorvastatin (Lipitor) 40 mg tablet [id number] Yes Take 1 tablet (40 mg) by mouth once daily. Marielle White MD Taking Active   losartan (Cozaar) 50 mg tablet [id number] Yes Take 1 tablet (50 mg) by mouth once daily. Marielle White MD Taking Active   meloxicam (Mobic) 15 mg tablet [id number]  Take 1 tablet (15 mg) by mouth once daily at bedtime. Marielle White MD   [id number]                    No Known Allergies    Review of Systems   Constitutional:  Negative for chills, fatigue and fever.   HENT:  Negative for nosebleeds, sneezing and trouble swallowing.    Respiratory:   Negative for shortness of breath and wheezing.    Cardiovascular:  Negative for chest pain and leg swelling.   Musculoskeletal:  Positive for arthralgias.   Skin:  Negative for pallor and rash.     Exam   On exam patient is alert, awake, and in no acute distress.  Head is normocephalic, no JVD, no auditory wheezes.  He has pain with active forward flexion of about 130 degrees today.  He has positive impingement signs today but fairly good cuff strength with resisted external rotation.  Some mild tenderness anteriorly over the biceps tendon sheath.  No severe AC joint tenderness today.  Decent elbow, wrist, and digital motion.  Skin is warm and dry, without ulcerations, no other swelling or lymphadenopathy.  Good pulses and sensation in the upper extremity.    Assessment   Encounter Diagnoses   Name Primary?    Right shoulder pain, unspecified chronicity     Shoulder impingement syndrome, right Yes     Plan   Previous injection has not given him any significant relief, he would like to try some formal therapy to see how much improvement this gives him.  We did discuss possible MRI in the future if he does not get significant improvement after a full course of formal therapy.  We will follow-up with him in 6 weeks and reassess him at that point    Written by Lexi Gruber saw, evaluated, and treated the patient with the PA

## 2024-09-14 ENCOUNTER — HOSPITAL ENCOUNTER (EMERGENCY)
Facility: HOSPITAL | Age: 59
Discharge: HOME | End: 2024-09-14
Payer: COMMERCIAL

## 2024-09-14 VITALS
HEIGHT: 73 IN | TEMPERATURE: 97 F | RESPIRATION RATE: 14 BRPM | SYSTOLIC BLOOD PRESSURE: 132 MMHG | OXYGEN SATURATION: 98 % | DIASTOLIC BLOOD PRESSURE: 74 MMHG | BODY MASS INDEX: 28.63 KG/M2 | HEART RATE: 78 BPM | WEIGHT: 216 LBS

## 2024-09-14 DIAGNOSIS — L50.9 HIVES: Primary | ICD-10-CM

## 2024-09-14 PROCEDURE — 2500000004 HC RX 250 GENERAL PHARMACY W/ HCPCS (ALT 636 FOR OP/ED)

## 2024-09-14 PROCEDURE — 96374 THER/PROPH/DIAG INJ IV PUSH: CPT

## 2024-09-14 PROCEDURE — 96375 TX/PRO/DX INJ NEW DRUG ADDON: CPT

## 2024-09-14 PROCEDURE — 99284 EMERGENCY DEPT VISIT MOD MDM: CPT | Mod: 25

## 2024-09-14 RX ORDER — EPINEPHRINE 0.15 MG/.3ML
1 INJECTION INTRAMUSCULAR ONCE
Qty: 0.3 ML | Refills: 0 | Status: SHIPPED | OUTPATIENT
Start: 2024-09-14 | End: 2024-09-14

## 2024-09-14 RX ORDER — FAMOTIDINE 10 MG/ML
INJECTION INTRAVENOUS
Status: COMPLETED
Start: 2024-09-14 | End: 2024-09-14

## 2024-09-14 RX ORDER — FAMOTIDINE 10 MG/ML
40 INJECTION INTRAVENOUS ONCE
Status: COMPLETED | OUTPATIENT
Start: 2024-09-14 | End: 2024-09-14

## 2024-09-14 RX ORDER — PREDNISONE 20 MG/1
20 TABLET ORAL 2 TIMES DAILY
Qty: 10 TABLET | Refills: 0 | Status: SHIPPED | OUTPATIENT
Start: 2024-09-14 | End: 2024-09-19

## 2024-09-14 RX ADMIN — METHYLPREDNISOLONE SODIUM SUCCINATE 125 MG: 125 INJECTION, POWDER, FOR SOLUTION INTRAMUSCULAR; INTRAVENOUS at 15:05

## 2024-09-14 RX ADMIN — FAMOTIDINE 40 MG: 10 INJECTION, SOLUTION INTRAVENOUS at 15:08

## 2024-09-14 RX ADMIN — FAMOTIDINE 40 MG: 10 INJECTION INTRAVENOUS at 15:08

## 2024-09-14 ASSESSMENT — PAIN SCALES - GENERAL: PAINLEVEL_OUTOF10: 0 - NO PAIN

## 2024-09-14 ASSESSMENT — PAIN - FUNCTIONAL ASSESSMENT: PAIN_FUNCTIONAL_ASSESSMENT: 0-10

## 2024-09-15 NOTE — ED PROVIDER NOTES
HPI   Chief Complaint   Patient presents with    Insect Bite     Pt with hives all over, c/o tingling to lips and roof of mouth       History of present illness:  59-year-old male presents to the emergency room for complaints of hives across his body.  The patient states began having symptoms after he was stung by a large hornet today.  He states that he is out trying to cut apart a downed tree that collapsed.  He states that he was cutting a branch he apparently had a nest of hornets and he states that they swarmed him immediately.  He states he believes he was stung on his left leg and possibly his left arm.  He believes he is only stung twice.  He states he is able to get away but within about 30 minutes after being stung he began noticing a rash developing across his body.  He states his hands felt very tingly as did his lips and top of his mouth.  He states he called EMS and took 25 mg of Benadryl prior to this.  EMS notes upon arrival the patient did not appear to be in any respiratory distress or any obvious signs of anaphylaxis.  They will start an IV and gave him 25 mg Benadryl IV as well.  The patient at this time states he still feels like his fingers are tingling and his lips are also tingling and slightly swollen.  He denies any prior heart or lung disease and denies any anaphylactic reaction in the past.  He denies any difficulty breathing or wheezing at this time.    Social history: Negative for alcohol and drug use.    Review of systems:   Gen.: No weight loss, fatigue, anorexia, insomnia, fever.   Eyes: No vision loss, double vision  ENT: No pharyngitis, neck pain  Cardiac: No chest pain, palpitations, syncope  Pulmonary: No shortness of breath, cough, hemoptysis.   Heme/lymph: No swollen glands, fever, bleeding.   GI: No abdominal pain, change in bowel habits, melena, hematemesis, hematochezia, nausea, vomiting, diarrhea.   : No discharge, dysuria, frequency, urgency, hematuria.    Musculoskeletal: No limb pain, joint pain, joint swelling.   Skin: confirms rash/hives  Review of systems is otherwise negative unless stated above or in history of present illness.      Physical exam:  General: Vitals noted, no distress. Afebrile.   EENT: No swelling is present across the face of the neck, posterior oropharynx unremarkable.   Cardiac: Regular, rate, rhythm, no murmur.   Pulmonary: Lungs clear bilaterally with good aeration. No adventitious breath sounds.   Abdomen: Soft, nonsurgical. Nontender. No peritoneal signs. Normoactive bowel sounds.   Extremities: No peripheral edema.   Skin: Hives are present across the patient's arms and legs and torso at this time  Neuro: No focal neurologic deficits      Medical decision making:   Testing: Clinical exam  Treatment: Solu-Medrol 125 mg IV, Pepcid 40 mg IV  Reevaluation: Patient reports complete resolution of the itching and the hives are improving at this time and are becoming more pale.  The patient was watched for over an hour in the emergency room after the medications were given.  Plan: Home-going.  Discussed differential. Will follow-up with the primary physician in the next 2-3 days. Return if worse. They understand return precautions and discharge instructions. Patient and family/friend/caregiver are in agreement with this plan. 59-year-old male presents to the emergency room for complaints of hives across his body.  The patient states began having symptoms after he was stung by a large hornet today.  He states that he is out trying to cut apart a downed tree that collapsed.  He states that he was cutting a branch he apparently had a nest of hornets and he states that they swarmed him immediately.  He states he believes he was stung on his left leg and possibly his left arm.  He believes he is only stung twice.  He states he is able to get away but within about 30 minutes after being stung he began noticing a rash developing across his body.  He  states his hands felt very tingly as did his lips and top of his mouth.  He states he called EMS and took 25 mg of Benadryl prior to this.  EMS notes upon arrival the patient did not appear to be in any respiratory distress or any obvious signs of anaphylaxis.  They will start an IV and gave him 25 mg Benadryl IV as well.  The patient at this time states he still feels like his fingers are tingling and his lips are also tingling and slightly swollen.  He denies any prior heart or lung disease and denies any anaphylactic reaction in the past.  He denies any difficulty breathing or wheezing at this time. Skin: Hives are present across the patient's arms and legs and torso at this time.  Lungs are clear to auscultation throughout.  I explained to the patient that I felt comfortable sending him at this time and encouraged him to follow-up with his primary care doctor.  I explained to him if sending home a short course of prednisone and also giving him an EpiPen to be used as needed in the event that he had any further symptoms.  I also explained to the patient signs symptoms to return to the emergency room for.  Impression:   1.  Allergic reaction            History provided by:  Patient   used: No            Patient History   Past Medical History:   Diagnosis Date    Essential (primary) hypertension     Benign essential HTN    Joint disorder, unspecified     Wrist disorder     Past Surgical History:   Procedure Laterality Date    APPENDECTOMY  05/22/2015    Appendectomy    OTHER SURGICAL HISTORY  05/22/2015    Wrist Surgery    TONSILLECTOMY  05/22/2015    Tonsillectomy     No family history on file.  Social History     Tobacco Use    Smoking status: Never    Smokeless tobacco: Never   Substance Use Topics    Alcohol use: Yes     Alcohol/week: 12.0 standard drinks of alcohol     Types: 12 Cans of beer per week    Drug use: Never       Physical Exam   ED Triage Vitals [09/14/24 1521]   Temperature  Heart Rate Respirations BP   36.1 °C (97 °F) 85 16 131/84      Pulse Ox Temp Source Heart Rate Source Patient Position   96 % Temporal Monitor Sitting      BP Location FiO2 (%)     Left arm --       Physical Exam      ED Course & MDM   Diagnoses as of 09/15/24 1620   Hives                 No data recorded     Carly Coma Scale Score: 15 (09/14/24 1522 : Wilda Curtis RN)                           Medical Decision Making      Procedure  Procedures     Dave Rothman PA-C  09/15/24 3286

## 2024-09-30 DIAGNOSIS — I10 BENIGN HYPERTENSION: ICD-10-CM

## 2024-09-30 RX ORDER — AMLODIPINE BESYLATE 10 MG/1
10 TABLET ORAL DAILY
Qty: 90 TABLET | Refills: 0 | Status: SHIPPED | OUTPATIENT
Start: 2024-09-30

## 2024-09-30 RX ORDER — LOSARTAN POTASSIUM 50 MG/1
50 TABLET ORAL DAILY
Qty: 90 TABLET | Refills: 0 | Status: SHIPPED | OUTPATIENT
Start: 2024-09-30

## 2024-10-07 ENCOUNTER — APPOINTMENT (OUTPATIENT)
Dept: ORTHOPEDIC SURGERY | Facility: CLINIC | Age: 59
End: 2024-10-07
Payer: COMMERCIAL

## 2024-10-22 ENCOUNTER — APPOINTMENT (OUTPATIENT)
Dept: PRIMARY CARE | Facility: CLINIC | Age: 59
End: 2024-10-22
Payer: COMMERCIAL

## 2024-10-28 ENCOUNTER — APPOINTMENT (OUTPATIENT)
Dept: ORTHOPEDIC SURGERY | Facility: CLINIC | Age: 59
End: 2024-10-28
Payer: COMMERCIAL

## 2024-11-11 ENCOUNTER — APPOINTMENT (OUTPATIENT)
Dept: ORTHOPEDIC SURGERY | Facility: CLINIC | Age: 59
End: 2024-11-11
Payer: COMMERCIAL

## 2024-11-11 DIAGNOSIS — R29.898 RIGHT ARM WEAKNESS: ICD-10-CM

## 2024-11-11 DIAGNOSIS — M75.101 TEAR OF RIGHT ROTATOR CUFF, UNSPECIFIED TEAR EXTENT, UNSPECIFIED WHETHER TRAUMATIC: Primary | ICD-10-CM

## 2024-11-11 DIAGNOSIS — M75.21 BICEPS TENDINITIS OF RIGHT SHOULDER: ICD-10-CM

## 2024-11-11 PROCEDURE — 99213 OFFICE O/P EST LOW 20 MIN: CPT | Performed by: ORTHOPAEDIC SURGERY

## 2024-11-11 PROCEDURE — 1036F TOBACCO NON-USER: CPT | Performed by: ORTHOPAEDIC SURGERY

## 2024-11-11 ASSESSMENT — PAIN - FUNCTIONAL ASSESSMENT: PAIN_FUNCTIONAL_ASSESSMENT: 0-10

## 2024-11-11 ASSESSMENT — PAIN SCALES - GENERAL: PAINLEVEL_OUTOF10: 5 - MODERATE PAIN

## 2024-11-12 ASSESSMENT — ENCOUNTER SYMPTOMS
FATIGUE: 0
WHEEZING: 0
SHORTNESS OF BREATH: 0
SORE THROAT: 0
FEVER: 0
JOINT SWELLING: 0
SINUS PAIN: 0
WOUND: 0
CHILLS: 0
ARTHRALGIAS: 1

## 2024-11-12 NOTE — PROGRESS NOTES
Reason for Appointment  Chief Complaint   Patient presents with    Right Shoulder - Follow-up     History of Present Illness  Patient is a 59 y.o. male here today for follow-up evaluation of continued right shoulder pain.  He has had multiple injections done by his primary care physician over the last few years.  These were giving him good relief but the last few injections did not give him any longstanding relief.  Finished out formal therapy at the end of last month that did not give him any improvement.  He continues to have lateral and more anterior shoulder pain with sleeping and any overhead activities and lifting.  Previous x-rays have only shown moderate AC joint DJD.  No other changes in his past medical history, allergies, or medications.    Past Medical History:   Diagnosis Date    Essential (primary) hypertension     Benign essential HTN    Joint disorder, unspecified     Wrist disorder       Past Surgical History:   Procedure Laterality Date    APPENDECTOMY  2015    Appendectomy    OTHER SURGICAL HISTORY  2015    Wrist Surgery    TONSILLECTOMY  2015    Tonsillectomy       Medication Documentation Review Audit       Reviewed by Zuri Rivero MA (Medical Assistant) on 24 at 1510      Medication Order Taking? Sig Documenting Provider Last Dose Status   amLODIPine (Norvasc) 10 mg tablet [id number] Yes Take 1 tablet (10 mg) by mouth once daily. Marielle White MD  Active   atorvastatin (Lipitor) 40 mg tablet [id number] Yes Take 1 tablet (40 mg) by mouth once daily. Marielle White MD Taking Active   EPINEPHrine (Epipen-JR) 0.15 mg/0.3 mL injection syringe [id number]  Inject 0.3 mL (0.15 mg) as directed 1 time for 1 dose. Inject into upper leg. Call 911 after use. Dave Rothman PA-C   24 2357   losartan (Cozaar) 50 mg tablet [id number] Yes Take 1 tablet (50 mg) by mouth once daily. Marielle White MD  Active                    Allergies   Allergen Reactions     Hornet Venom Hives     Per pt european hornet       Review of Systems   Constitutional:  Negative for chills, fatigue and fever.   HENT:  Negative for mouth sores, sinus pain and sore throat.    Respiratory:  Negative for shortness of breath and wheezing.    Cardiovascular:  Negative for chest pain and leg swelling.   Musculoskeletal:  Positive for arthralgias. Negative for joint swelling.   Skin:  Negative for rash and wound.     Exam   On exam the right shoulder still shows significant pain with about 130 degrees of active forward flexion today.  He has positive impingement signs today with just some mild weakness with resisted external rotation compared to the opposite side but fairly good cuff strength.  Deltoid is functional.  Tenderness anteriorly over the biceps tendon sheath.  Pain with an open palm resistance test.  Good pulses and sensation in the upper extremity.    Assessment   Encounter Diagnoses   Name Primary?    Tear of right rotator cuff, unspecified tear extent, unspecified whether traumatic Yes    Right arm weakness    Right biceps tendinitis    Plan   He has failed conservative treatment with multiple sets of injections as well as formal therapy and continues to have significant pain in the shoulder.  Most of his symptoms today are anterior over the biceps tendon sheath but he is having some lateral sided pain and weakness on clinical exam.  We will get an MRI of the right shoulder and follow-up with them after further imaging to discuss possible surgical options at that point.    I, Lexi Pereira PA-C, am acting as a scribe and attest that this documentation has been prepared under the direction and in the presence of Charles Gruber MD.  By signing below, I, Charles Gruber MD, personally performed the services described in this documentation. All medical record entries made by the scribe were at my direction and in my presence. I have reviewed the chart and agree that the record reflects my  personal performance and is accurate and complete.

## 2024-11-20 ENCOUNTER — HOSPITAL ENCOUNTER (OUTPATIENT)
Dept: RADIOLOGY | Facility: HOSPITAL | Age: 59
Discharge: HOME | End: 2024-11-20
Payer: COMMERCIAL

## 2024-11-20 DIAGNOSIS — M75.101 TEAR OF RIGHT ROTATOR CUFF, UNSPECIFIED TEAR EXTENT, UNSPECIFIED WHETHER TRAUMATIC: ICD-10-CM

## 2024-11-20 DIAGNOSIS — R29.898 RIGHT ARM WEAKNESS: ICD-10-CM

## 2024-11-20 PROCEDURE — 73221 MRI JOINT UPR EXTREM W/O DYE: CPT | Mod: RIGHT SIDE | Performed by: RADIOLOGY

## 2024-11-20 PROCEDURE — 73221 MRI JOINT UPR EXTREM W/O DYE: CPT | Mod: RT

## 2024-11-22 DIAGNOSIS — E78.00 HYPERCHOLESTEROLEMIA: ICD-10-CM

## 2024-11-22 RX ORDER — ATORVASTATIN CALCIUM 40 MG/1
40 TABLET, FILM COATED ORAL DAILY
Qty: 90 TABLET | Refills: 1 | Status: SHIPPED | OUTPATIENT
Start: 2024-11-22

## 2024-12-16 ENCOUNTER — APPOINTMENT (OUTPATIENT)
Dept: ORTHOPEDIC SURGERY | Facility: CLINIC | Age: 59
End: 2024-12-16
Payer: COMMERCIAL

## 2024-12-16 DIAGNOSIS — M75.101 TEAR OF RIGHT ROTATOR CUFF, UNSPECIFIED TEAR EXTENT, UNSPECIFIED WHETHER TRAUMATIC: Primary | ICD-10-CM

## 2024-12-16 PROCEDURE — 99213 OFFICE O/P EST LOW 20 MIN: CPT | Performed by: ORTHOPAEDIC SURGERY

## 2024-12-16 PROCEDURE — 1036F TOBACCO NON-USER: CPT | Performed by: ORTHOPAEDIC SURGERY

## 2024-12-16 ASSESSMENT — ENCOUNTER SYMPTOMS
FEVER: 0
SHORTNESS OF BREATH: 0
FATIGUE: 0
BRUISES/BLEEDS EASILY: 0
ARTHRALGIAS: 1
WHEEZING: 0
CHILLS: 0

## 2024-12-16 ASSESSMENT — PAIN - FUNCTIONAL ASSESSMENT: PAIN_FUNCTIONAL_ASSESSMENT: 0-10

## 2024-12-16 ASSESSMENT — PAIN SCALES - GENERAL: PAINLEVEL_OUTOF10: 7

## 2024-12-16 NOTE — PROGRESS NOTES
Reason for Appointment  Chief Complaint   Patient presents with    Right Shoulder - Results     History of Present Illness  Patient is a 59 y.o. male here today for follow-up evaluation of right shoulder MRI results. We last saw the patient on 24 for continues right shoulder pain and we discussed getting an MRI of the right shoulder. MRI taken of the right shoulder on 24 were reviewed and showed full thickness tear of the supraspinatus, partial subscapularis tear, and moderate ac joint arthritis, mild supra atrophy. Today he is getting night time pain and overhead pain. Pain when bringing the arm down. He hauls steel for work. No recent falls or injuries. No other changes in past medical history, allergies, or medications.    Past Medical History:   Diagnosis Date    Essential (primary) hypertension     Benign essential HTN    Joint disorder, unspecified     Wrist disorder       Past Surgical History:   Procedure Laterality Date    APPENDECTOMY  2015    Appendectomy    OTHER SURGICAL HISTORY  2015    Wrist Surgery    TONSILLECTOMY  2015    Tonsillectomy       Medication Documentation Review Audit       Reviewed by Zuri Rivero MA (Medical Assistant) on 24 at 1531      Medication Order Taking? Sig Documenting Provider Last Dose Status   amLODIPine (Norvasc) 10 mg tablet [id number] Yes Take 1 tablet (10 mg) by mouth once daily. Marielle White MD  Active   atorvastatin (Lipitor) 40 mg tablet [id number] Yes Take 1 tablet (40 mg) by mouth once daily. Marielle White MD  Active   EPINEPHrine (Epipen-JR) 0.15 mg/0.3 mL injection syringe [id number]  Inject 0.3 mL (0.15 mg) as directed 1 time for 1 dose. Inject into upper leg. Call 911 after use. Dave Rothman PA-C   24 2357   losartan (Cozaar) 50 mg tablet [id number] Yes Take 1 tablet (50 mg) by mouth once daily. Marielle White MD  Active                    Allergies   Allergen Reactions    Hornet Venom Hives      Per pt european hornet       Review of Systems   Constitutional:  Negative for chills, fatigue and fever.   Respiratory:  Negative for shortness of breath and wheezing.    Cardiovascular:  Negative for chest pain and leg swelling.   Musculoskeletal:  Positive for arthralgias.   Allergic/Immunologic: Negative for immunocompromised state.   Hematological:  Does not bruise/bleed easily.       Exam   He has good elevation over 140. Pain with bringing the arm down. Mild weakness in external rotation. Mild impingement sign. Crepitation over the biceps. Tender over the biceps. Mild atrophy of the supra.     Assessment   Right rotator cuff full thickness tear    Plan   We went over with him surgical intervention vs non-operative treatment. We had a long discussion about a right arthroscopic rotator cuff repair. We discussed there is no rush into surgical intervention and is when pain warrants. We discussed pain relief from the surgery. We reviewed risks and benefits of surgery, and all questions were answered. We dicussed the recovery phase. We discussed being careful with the shoulder after surgery. His biggest concern with the surgery is the recovery phase and we gave him a handout that goes over the recovery phase even further. Based off the current MRI of the right shoulder he would need to get this fixed in the next couple of months with getting a repeat MRI of the right shoulder.      I, Neida Ya, attest that this documentation has been prepared under the direction and in the presence of Charles Gruber MD.   By signing below, I, Charels Gruber MD, personally performed the services described in this documentation. All medical record entries made by the scribe were at my direction and in my presence. I have reviewed the chart and agree that the record reflects my personal performance and is accurate and complete.

## 2024-12-20 ENCOUNTER — PREP FOR PROCEDURE (OUTPATIENT)
Dept: OPERATING ROOM | Facility: HOSPITAL | Age: 59
End: 2024-12-20
Payer: COMMERCIAL

## 2024-12-20 DIAGNOSIS — M75.121 COMPLETE TEAR OF RIGHT ROTATOR CUFF, UNSPECIFIED WHETHER TRAUMATIC: Primary | ICD-10-CM

## 2024-12-20 RX ORDER — CEFAZOLIN SODIUM 2 G/100ML
2 INJECTION, SOLUTION INTRAVENOUS ONCE
OUTPATIENT
Start: 2024-12-20 | End: 2024-12-20

## 2025-01-07 DIAGNOSIS — I10 BENIGN HYPERTENSION: ICD-10-CM

## 2025-01-07 RX ORDER — AMLODIPINE BESYLATE 10 MG/1
10 TABLET ORAL DAILY
Qty: 30 TABLET | Refills: 0 | Status: SHIPPED | OUTPATIENT
Start: 2025-01-07

## 2025-01-07 RX ORDER — LOSARTAN POTASSIUM 50 MG/1
50 TABLET ORAL DAILY
Qty: 30 TABLET | Refills: 0 | Status: SHIPPED | OUTPATIENT
Start: 2025-01-07

## 2025-01-23 ENCOUNTER — PRE-ADMISSION TESTING (OUTPATIENT)
Dept: PREADMISSION TESTING | Facility: HOSPITAL | Age: 60
End: 2025-01-23
Payer: COMMERCIAL

## 2025-01-23 VITALS
HEART RATE: 67 BPM | TEMPERATURE: 97.7 F | SYSTOLIC BLOOD PRESSURE: 127 MMHG | BODY MASS INDEX: 28.89 KG/M2 | HEIGHT: 73 IN | WEIGHT: 218 LBS | DIASTOLIC BLOOD PRESSURE: 84 MMHG | OXYGEN SATURATION: 99 % | RESPIRATION RATE: 16 BRPM

## 2025-01-23 DIAGNOSIS — Z01.818 PRE-OP TESTING: Primary | ICD-10-CM

## 2025-01-23 DIAGNOSIS — M75.121 COMPLETE TEAR OF RIGHT ROTATOR CUFF, UNSPECIFIED WHETHER TRAUMATIC: ICD-10-CM

## 2025-01-23 LAB
ANION GAP SERPL CALCULATED.3IONS-SCNC: 10 MMOL/L (ref 10–20)
BASOPHILS # BLD AUTO: 0.09 X10*3/UL (ref 0–0.1)
BASOPHILS NFR BLD AUTO: 1.1 %
BUN SERPL-MCNC: 15 MG/DL (ref 6–23)
CALCIUM SERPL-MCNC: 8.8 MG/DL (ref 8.6–10.3)
CHLORIDE SERPL-SCNC: 108 MMOL/L (ref 98–107)
CO2 SERPL-SCNC: 23 MMOL/L (ref 21–32)
CREAT SERPL-MCNC: 0.8 MG/DL (ref 0.5–1.3)
EGFRCR SERPLBLD CKD-EPI 2021: >90 ML/MIN/1.73M*2
EOSINOPHIL # BLD AUTO: 0.37 X10*3/UL (ref 0–0.7)
EOSINOPHIL NFR BLD AUTO: 4.6 %
ERYTHROCYTE [DISTWIDTH] IN BLOOD BY AUTOMATED COUNT: 11.9 % (ref 11.5–14.5)
GLUCOSE SERPL-MCNC: 96 MG/DL (ref 74–99)
HCT VFR BLD AUTO: 45.5 % (ref 41–52)
HGB BLD-MCNC: 15.3 G/DL (ref 13.5–17.5)
IMM GRANULOCYTES # BLD AUTO: 0.01 X10*3/UL (ref 0–0.7)
IMM GRANULOCYTES NFR BLD AUTO: 0.1 % (ref 0–0.9)
LYMPHOCYTES # BLD AUTO: 2.38 X10*3/UL (ref 1.2–4.8)
LYMPHOCYTES NFR BLD AUTO: 29.5 %
MCH RBC QN AUTO: 29.9 PG (ref 26–34)
MCHC RBC AUTO-ENTMCNC: 33.6 G/DL (ref 32–36)
MCV RBC AUTO: 89 FL (ref 80–100)
MONOCYTES # BLD AUTO: 0.62 X10*3/UL (ref 0.1–1)
MONOCYTES NFR BLD AUTO: 7.7 %
NEUTROPHILS # BLD AUTO: 4.6 X10*3/UL (ref 1.2–7.7)
NEUTROPHILS NFR BLD AUTO: 57 %
NRBC BLD-RTO: 0 /100 WBCS (ref 0–0)
PLATELET # BLD AUTO: 203 X10*3/UL (ref 150–450)
POTASSIUM SERPL-SCNC: 3.9 MMOL/L (ref 3.5–5.3)
RBC # BLD AUTO: 5.12 X10*6/UL (ref 4.5–5.9)
SODIUM SERPL-SCNC: 137 MMOL/L (ref 136–145)
WBC # BLD AUTO: 8.1 X10*3/UL (ref 4.4–11.3)

## 2025-01-23 PROCEDURE — 99204 OFFICE O/P NEW MOD 45 MIN: CPT | Performed by: PHYSICIAN ASSISTANT

## 2025-01-23 PROCEDURE — 36415 COLL VENOUS BLD VENIPUNCTURE: CPT

## 2025-01-23 PROCEDURE — 80048 BASIC METABOLIC PNL TOTAL CA: CPT

## 2025-01-23 PROCEDURE — 87081 CULTURE SCREEN ONLY: CPT | Mod: TRILAB

## 2025-01-23 PROCEDURE — 85025 COMPLETE CBC W/AUTO DIFF WBC: CPT

## 2025-01-23 RX ORDER — CHLORHEXIDINE GLUCONATE ORAL RINSE 1.2 MG/ML
SOLUTION DENTAL
Qty: 473 ML | Refills: 0 | Status: SHIPPED | OUTPATIENT
Start: 2025-01-23 | End: 2025-02-05

## 2025-01-23 ASSESSMENT — DUKE ACTIVITY SCORE INDEX (DASI)
CAN YOU WALK INDOORS, SUCH AS AROUND YOUR HOUSE: YES
CAN YOU RUN A SHORT DISTANCE: YES
CAN YOU DO HEAVY WORK AROUND THE HOUSE LIKE SCRUBBING FLOORS OR LIFTING AND MOVING HEAVY FURNITURE: YES
CAN YOU PARTICIPATE IN MODERATE RECREATIONAL ACTIVITIES LIKE GOLF, BOWLING, DANCING, DOUBLES TENNIS OR THROWING A BASEBALL OR FOOTBALL: NO
CAN YOU CLIMB A FLIGHT OF STAIRS OR WALK UP A HILL: YES
CAN YOU DO MODERATE WORK AROUND THE HOUSE LIKE VACUUMING, SWEEPING FLOORS OR CARRYING GROCERIES: YES
TOTAL_SCORE: 44.7
CAN YOU DO YARD WORK LIKE RAKING LEAVES, WEEDING OR PUSHING A MOWER: YES
CAN YOU PARTICIPATE IN STRENOUS SPORTS LIKE SWIMMING, SINGLES TENNIS, FOOTBALL, BASKETBALL, OR SKIING: NO
CAN YOU DO LIGHT WORK AROUND THE HOUSE LIKE DUSTING OR WASHING DISHES: YES
CAN YOU TAKE CARE OF YOURSELF (EAT, DRESS, BATHE, OR USE TOILET): YES
CAN YOU WALK A BLOCK OR TWO ON LEVEL GROUND: YES
DASI METS SCORE: 8.2
CAN YOU HAVE SEXUAL RELATIONS: YES

## 2025-01-23 NOTE — H&P (VIEW-ONLY)
"CPM/PAT Evaluation       Name: Av DUBOIS Nolan (Av Francisco)  /Age: 1965/59 y.o.     In-Person       Chief Complaint: \"shoulder pain\"    HPI  The patient is a 59 year old male.  For the past several years he has had intermittent right shoulder pain with reaching, lifting and when lying in bed.  The pain radiates to the right upper arm.  He has associated arm weakness and occasional numbness in the right hand.  He has done physical therapy in the past and has had multiple cortisone injections.  His last injection was more than 1 year ago and he continues with shoulder pain.  He was seen by Dr. Gruber and a right shoulder MRI on 2024 demonstrated a full-thickness full width retracted supraspinatus tear to the level of the glenohumeral joint.  Moderate anterior infraspinatus tendinosis without tear.  High-grade undersurface tear observed involving the superior most subscapularis with tendinosis of the rest of the fibers.  Moderate AC joint osteoarthrosis with osteophytes and capsular distension.  Surgical intervention is recommended.    Past Medical History:   Diagnosis Date    Essential (primary) hypertension     Benign essential HTN    GERD (gastroesophageal reflux disease)     Hyperlipidemia        Past Surgical History:   Procedure Laterality Date    APPENDECTOMY      COLONOSCOPY      ORIF WRIST FRACTURE Left     OTHER SURGICAL HISTORY  2015    Wrist Surgery    TONSILLECTOMY  1969     Social History     Tobacco Use    Smoking status: Never    Smokeless tobacco: Never   Substance Use Topics    Alcohol use: Yes     Alcohol/week: 12.0 standard drinks of alcohol     Types: 12 Cans of beer per week     Social History     Substance and Sexual Activity   Drug Use Never     Allergies   Allergen Reactions    Hornet Venom Hives     Per pt european hornet     Current Outpatient Medications   Medication Sig Dispense Refill    amLODIPine (Norvasc) 10 mg tablet Take 1 tablet (10 mg) by mouth once daily. 30 " "tablet 0    atorvastatin (Lipitor) 40 mg tablet Take 1 tablet (40 mg) by mouth once daily. 90 tablet 1    chlorhexidine (Peridex) 0.12 % solution Use as directed. 473 mL 0    EPINEPHrine (Epipen-JR) 0.15 mg/0.3 mL injection syringe Inject 0.3 mL (0.15 mg) as directed 1 time for 1 dose. Inject into upper leg. Call 911 after use. 0.3 mL 0    losartan (Cozaar) 50 mg tablet Take 1 tablet (50 mg) by mouth once daily. 30 tablet 0     No current facility-administered medications for this visit.     Review of Systems   Musculoskeletal:         See HPI   All other systems reviewed and are negative.    /84   Pulse 67   Temp 36.5 °C (97.7 °F) (Temporal)   Resp 16   Ht 1.854 m (6' 1\")   Wt 98.9 kg (218 lb)   SpO2 99%   BMI 28.76 kg/m²     Physical Exam  Vitals reviewed.   Constitutional:       Appearance: Normal appearance.   HENT:      Head: Normocephalic and atraumatic.      Mouth/Throat:      Mouth: Mucous membranes are moist.      Pharynx: Oropharynx is clear.   Eyes:      Extraocular Movements: Extraocular movements intact.      Pupils: Pupils are equal, round, and reactive to light.      Comments: Glasses     Cardiovascular:      Rate and Rhythm: Normal rate and regular rhythm.      Heart sounds: Normal heart sounds.   Pulmonary:      Effort: Pulmonary effort is normal.      Breath sounds: Normal breath sounds.   Abdominal:      General: Bowel sounds are normal.      Palpations: Abdomen is soft.   Musculoskeletal:         General: No swelling.      Comments: Tenderness with palpation and mildly limited range of motion right shoulder.    Skin:     General: Skin is warm and dry.   Neurological:      General: No focal deficit present.      Mental Status: He is alert and oriented to person, place, and time.   Psychiatric:         Mood and Affect: Mood normal.         Behavior: Behavior normal.        PAT AIRWAY:   Airway:     Mallampati::  III    TM distance::  >3 FB    Neck ROM::  Full   Teeth intact    ASA:  " II  DASI SCORE:  44.7  METS SCORE:  8.2  CHAD2 SCORE:  2.8%  REVISED CARDIAC RISK INDEX:  0.4%  STOP BANG SCORE:  5  CAPRINI DVT SCORE:  5  BALBINA SCORE:  0.14%  ARISCAT SCORE:   1.6%    CBC, BMP, MRSA ordered during PAT visit    Assessment and Plan:     Complete tear of right rotator cuff:  Right shoulder arthroscopy, rotator cuff repair  Hypertension - taking Cozaar and Norvasc  GERD - mild, no medication required    Nesha Franklin PA-C

## 2025-01-23 NOTE — PREPROCEDURE INSTRUCTIONS
Preoperative Fasting Guidelines    Why must I stop eating and drinking near surgery time?  With sedation, food or liquid in your stomach can enter your lungs causing serious complications  Increases nausea and vomiting    When do I need to stop eating and drinking before my surgery?  Do not eat any food after midnight the night before your surgery/procedure.  You may have up to 13.5 ounces of clear liquid until TWO hours before your instructed arrival time to the hospital.  This includes water, black tea/coffee, (no milk or cream) apple juice, and electrolyte drinks (Gatorade)  You may chew gum until TWO hours before your surgery/procedure    PAT DISCHARGE INSTRUCTIONS    Please call the Same Day Surgery (SDS) Department of the hospital where your procedure will be performed after 2:00 PM the day before your surgery. If you are scheduled on a Monday, or a Tuesday following a Monday holiday, you will need to call on the last business day prior to your surgery.      [facility]  [address] [place] [address]  [phone]    Please let your surgeon know if:      You develop any open sores, shingles, burning or painful urination as these may increase your risk of an infection.   You no longer wish to have the surgery.   Any other personal circumstances change that may lead to the need to cancel or defer this surgery-such as being sick or getting admitted to any hospital within one week of your planned procedure.    Your contact details change, such as a change of address or phone number.    Starting now:     Please DO NOT drink alcohol or smoke for 24 hours before surgery. It is well known that quitting smoking can make a huge difference to your health and recovery from surgery. The longer you abstain from smoking, the better your chances of a healthy recovery. If you need help with quitting, call 9-800QUIT-NOW to be connected to a trained counselor who will discuss  the best methods to help you quit.     Before your surgery:    Please stop all supplements 7 days prior to surgery. Or as directed by your surgeon.   Please stop taking NSAID pain medicine such as Advil and Motrin 7 days before surgery.    If you develop any fever, cough, cold, rashes, cuts, scratches, scrapes, urinary symptoms or infection anywhere on your body (including teeth and gums) prior to surgery, please call your surgeon’s office as soon as possible. This may require treatment to reduce the chance of cancellation on the day of surgery.    The day before your surgery:   DIET- Please follow the diet instructions at the top of your packet.   Get a good night’s rest.  Use the special soap for bathing if you have been instructed to use one.    Scheduled surgery times may change and you will be notified if this occurs - please check your personal voicemail for any updates.     On the morning of surgery:   Wear comfortable, loose fitting clothes which open in the front. Please do not wear moisturizers, creams, lotions, makeup or perfume.    Please bring with you to surgery:   Photo ID and insurance card   Current list of medicines and allergies   Pacemaker/ Defibrillator/Heart stent cards   CPAP machine and mask    Slings/ splints/ crutches   A copy of your complete advanced directive/DHPOA.    Please do NOT bring with you to surgery:   All jewelry and valuables should be left at home.   Prosthetic devices such as contact lenses, hearing aids, dentures, eyelash extensions, hairpins and body piercings must be removed prior to going in to the surgical suite.    After outpatient surgery:   A responsible adult MUST accompany you at the time of discharge and stay with you for 24 hours after your surgery. You may NOT drive yourself home after surgery.    Do not drive, operate machinery, make critical decisions or do activities that require co-ordination or balance until after a night’s sleep.   Do not drink alcoholic  beverages for 24 hours.   Instructions for resuming your medications will be provided by your surgeon.    CALL YOUR DOCTOR AFTER SURGERY IF YOU HAVE:     Chills and/or a fever of 101° F or higher.    Redness, swelling, pus or drainage from your surgical wound or a bad smell from the wound.    Lightheadedness, fainting or confusion.    Persistent vomiting (throwing up) and are not able to eat or drink for 12 hours.    Three or more loose, watery bowel movements in 24 hours (diarrhea).   Difficulty or pain while urinating( after non-urological surgery)    Pain and swelling in your legs, especially if it is only on one side.    Difficulty breathing or are breathing faster than normal.    Any new concerning symptoms.      Patient Information: Pre-Operative Infection Prevention Measures     Why did I have my nose, under my arms, and groin swabbed?  The purpose of the swab is to identify Staphylococcus aureus inside your nose or on your skin.  The swab was sent to the laboratory for culture.  A positive swab/culture for Staphylococcus aureus is called colonization or carriage.      What is Staphylococcus aureus?  Staphylococcus aureus, also known as “staph”, is a germ found on the skin or in the nose of healthy people.  Sometimes Staphylococcus aureus can get into the body and cause an infection.  This can be minor (such as pimples, boils, or other skin problems).  It might also be serious (such as a blood infection, pneumonia, or a surgical site infection).    What is Staphylococcus aureus colonization or carriage?  Colonization or carriage means that a person has the germ but is not sick from it.  These bacteria can be spread on the hands or when breathing or sneezing.    How is Staphylococcus aureus spread?  It is most often spread by close contact with a person or item that carries it.    What happens if my culture is positive for Staphylococcus aureus?  Your doctor/medical team will use this information to guide any  antibiotic treatment which may be necessary.  Regardless of the culture results, we will clean the inside of your nose with a betadine swab just before you have your surgery.      Will I get an infection if I have Staphylococcus aureus in my nose or on my skin?  Anyone can get an infection with Staphylococcus aureus.  However, the best way to reduce your risk of infection is to follow the instructions provided to you for the use of your CHG soap and dental rinse.        Patient Information: Oral/Dental Rinse    What is oral/dental rinse?   It is a mouthwash. It is a way of cleaning the mouth with a germ-killing solution before your surgery.  The solution contains chlorhexidine, commonly known as CHG.   It is used inside the mouth to kill a bacteria known as Staphylococcus aureus.  Let your doctor know if you are allergic to Chlorhexidine.    Why do I need to use CHG oral/dental rinse?  The CHG oral/dental rinse helps to kill a bacteria in your mouth known as Staphylococcus aureus.     This reduces the risk of infection at the surgical site.      Using your CHG oral/dental rinse  STEPS:  Use your CHG oral/dental rinse after you brush your teeth the night before (at bedtime) and the morning of your surgery.  Follow all directions on your prescription label.    Use the cap on the container to measure 15ml   Swish (gargle if you can) the mouthwash in your mouth for at least 30 seconds, (do not swallow) and spit out  After you use your CHG rinse, do not rinse your mouth with water, drink or eat.  Please refer to the prescription label for the appropriate time to resume oral intake      What side effects might I have using the CHG oral/dental rinse?  CHG rinse will stick to plaque on the teeth.  Brush and floss just before use.  Teeth brushing will help avoid staining of plaque during use.      Patient Information: Home Preoperative Antibacterial Shower      What is a home preoperative antibacterial shower?  This shower  is a way of cleaning the skin with a germ-killing solution before surgery.  The solution contains chlorhexidine, commonly known as CHG.  CHG is a skin cleanser with germ-killing ability.  Let your doctor know if you are allergic to chlorhexidine.    Why do I need to take a preoperative antibacterial shower?  Skin is not sterile.  It is best to try to make your skin as free of germs as possible before surgery.  Proper cleansing with a germ-killing soap before surgery can lower the number of germs on your skin.  This helps to reduce the risk of infection at the surgical site.  Following the instructions listed below will help you prepare your skin for surgery.      How do I use the solution?  Steps:  Begin using your CHG soap 5 days before your scheduled surgery on ________________________.    First, wash and rinse your hair using the CHG soap. Keep CHG soap away from ear canals and eyes.  Rinse completely, do not condition.  Hair extensions should be removed.  Wash your face with your normal soap and rinse.    Apply the CHG solution to a clean wet washcloth.  Turn the water off or move away from the water spray to avoid premature rinsing of the CHG soap as you are applying.   Firmly lather your entire body from the neck down.  Do not use on your face.  Pay special attention to the area(s) where your incision(s) will be located unless they are on your face.  Avoid scrubbing your skin too hard.  The important point is to have the CHG soap sit on your skin for 3 minutes.    When the 3 minutes are up, turn on the water and rinse the CHG solution off your body completely.   DO NOT wash with regular soap after you have used the CHG soap solution  Pat yourself dry with a clean, freshly-laundered towel.  DO NOT apply powders, deodorants, or lotions.  Dress in clean, freshly laundered nightclothes.    Be sure to sleep with clean, freshly laundered sheets.  Be aware that CHG will cause stains on fabrics; if you wash them with  bleach after use.  Rinse your washcloth and other linens that have contact with CHG completely.  Use only non-chlorine detergents to launder the items used.   The morning of surgery is the fifth day.  Repeat the above steps and dress in clean comfortable clothing     Whom should I contact if I have any questions regarding the use of CHG soap?  Call the [facility] at [phone] if you have any questions.              Medication List            Accurate as of January 23, 2025  3:14 PM. Always use your most recent med list.                amLODIPine 10 mg tablet  Commonly known as: Norvasc  Take 1 tablet (10 mg) by mouth once daily.  Medication Adjustments for Surgery: Take on the morning of surgery     atorvastatin 40 mg tablet  Commonly known as: Lipitor  Take 1 tablet (40 mg) by mouth once daily.  Medication Adjustments for Surgery: Take on the morning of surgery     chlorhexidine 0.12 % solution  Commonly known as: Peridex  Use as directed.  Medication Adjustments for Surgery: Take/Use as prescribed     EPINEPHrine 0.15 mg/0.3 mL injection syringe  Commonly known as: Epipen-JR  Inject 0.3 mL (0.15 mg) as directed 1 time for 1 dose. Inject into upper leg. Call 911 after use.     losartan 50 mg tablet  Commonly known as: Cozaar  Take 1 tablet (50 mg) by mouth once daily.  Medication Adjustments for Surgery: Take last dose 1 day (24 hours) before surgery

## 2025-01-25 LAB — STAPHYLOCOCCUS SPEC CULT: NORMAL

## 2025-02-05 ENCOUNTER — ANESTHESIA (OUTPATIENT)
Dept: OPERATING ROOM | Facility: HOSPITAL | Age: 60
End: 2025-02-05
Payer: COMMERCIAL

## 2025-02-05 ENCOUNTER — ANESTHESIA EVENT (OUTPATIENT)
Dept: OPERATING ROOM | Facility: HOSPITAL | Age: 60
End: 2025-02-05
Payer: COMMERCIAL

## 2025-02-05 ENCOUNTER — HOSPITAL ENCOUNTER (OUTPATIENT)
Facility: HOSPITAL | Age: 60
Setting detail: OUTPATIENT SURGERY
Discharge: HOME | End: 2025-02-05
Attending: ORTHOPAEDIC SURGERY | Admitting: ORTHOPAEDIC SURGERY
Payer: COMMERCIAL

## 2025-02-05 VITALS
HEIGHT: 73 IN | OXYGEN SATURATION: 93 % | HEART RATE: 82 BPM | TEMPERATURE: 97.3 F | WEIGHT: 211.86 LBS | RESPIRATION RATE: 19 BRPM | SYSTOLIC BLOOD PRESSURE: 121 MMHG | BODY MASS INDEX: 28.08 KG/M2 | DIASTOLIC BLOOD PRESSURE: 75 MMHG

## 2025-02-05 DIAGNOSIS — M75.21 BICEPS TENDINITIS OF RIGHT UPPER EXTREMITY: ICD-10-CM

## 2025-02-05 DIAGNOSIS — M75.121 COMPLETE TEAR OF RIGHT ROTATOR CUFF, UNSPECIFIED WHETHER TRAUMATIC: Primary | ICD-10-CM

## 2025-02-05 PROCEDURE — 2500000001 HC RX 250 WO HCPCS SELF ADMINISTERED DRUGS (ALT 637 FOR MEDICARE OP): Performed by: STUDENT IN AN ORGANIZED HEALTH CARE EDUCATION/TRAINING PROGRAM

## 2025-02-05 PROCEDURE — 2720000007 HC OR 272 NO HCPCS: Performed by: ORTHOPAEDIC SURGERY

## 2025-02-05 PROCEDURE — 3600000004 HC OR TIME - INITIAL BASE CHARGE - PROCEDURE LEVEL FOUR: Performed by: ORTHOPAEDIC SURGERY

## 2025-02-05 PROCEDURE — 29827 SHO ARTHRS SRG RT8TR CUF RPR: CPT | Performed by: ORTHOPAEDIC SURGERY

## 2025-02-05 PROCEDURE — 2500000004 HC RX 250 GENERAL PHARMACY W/ HCPCS (ALT 636 FOR OP/ED)

## 2025-02-05 PROCEDURE — 2500000004 HC RX 250 GENERAL PHARMACY W/ HCPCS (ALT 636 FOR OP/ED): Performed by: ORTHOPAEDIC SURGERY

## 2025-02-05 PROCEDURE — 2780000003 HC OR 278 NO HCPCS: Performed by: ORTHOPAEDIC SURGERY

## 2025-02-05 PROCEDURE — 2500000004 HC RX 250 GENERAL PHARMACY W/ HCPCS (ALT 636 FOR OP/ED): Performed by: STUDENT IN AN ORGANIZED HEALTH CARE EDUCATION/TRAINING PROGRAM

## 2025-02-05 PROCEDURE — C1713 ANCHOR/SCREW BN/BN,TIS/BN: HCPCS | Performed by: ORTHOPAEDIC SURGERY

## 2025-02-05 PROCEDURE — 23430 REPAIR BICEPS TENDON: CPT | Performed by: ORTHOPAEDIC SURGERY

## 2025-02-05 PROCEDURE — 7100000002 HC RECOVERY ROOM TIME - EACH INCREMENTAL 1 MINUTE: Performed by: ORTHOPAEDIC SURGERY

## 2025-02-05 PROCEDURE — 3600000009 HC OR TIME - EACH INCREMENTAL 1 MINUTE - PROCEDURE LEVEL FOUR: Performed by: ORTHOPAEDIC SURGERY

## 2025-02-05 PROCEDURE — 7100000010 HC PHASE TWO TIME - EACH INCREMENTAL 1 MINUTE: Performed by: ORTHOPAEDIC SURGERY

## 2025-02-05 PROCEDURE — 2500000004 HC RX 250 GENERAL PHARMACY W/ HCPCS (ALT 636 FOR OP/ED): Performed by: PHYSICIAN ASSISTANT

## 2025-02-05 PROCEDURE — 7100000001 HC RECOVERY ROOM TIME - INITIAL BASE CHARGE: Performed by: ORTHOPAEDIC SURGERY

## 2025-02-05 PROCEDURE — 3700000002 HC GENERAL ANESTHESIA TIME - EACH INCREMENTAL 1 MINUTE: Performed by: ORTHOPAEDIC SURGERY

## 2025-02-05 PROCEDURE — 64415 NJX AA&/STRD BRCH PLXS IMG: CPT | Performed by: STUDENT IN AN ORGANIZED HEALTH CARE EDUCATION/TRAINING PROGRAM

## 2025-02-05 PROCEDURE — A23430 PR REPAIR BICEPS LONG TENDON: Performed by: STUDENT IN AN ORGANIZED HEALTH CARE EDUCATION/TRAINING PROGRAM

## 2025-02-05 PROCEDURE — 3700000001 HC GENERAL ANESTHESIA TIME - INITIAL BASE CHARGE: Performed by: ORTHOPAEDIC SURGERY

## 2025-02-05 PROCEDURE — 23430 REPAIR BICEPS TENDON: CPT | Performed by: PHYSICIAN ASSISTANT

## 2025-02-05 PROCEDURE — 2500000005 HC RX 250 GENERAL PHARMACY W/O HCPCS: Performed by: ORTHOPAEDIC SURGERY

## 2025-02-05 PROCEDURE — A4649 SURGICAL SUPPLIES: HCPCS | Performed by: ORTHOPAEDIC SURGERY

## 2025-02-05 PROCEDURE — 29823 SHO ARTHRS SRG XTNSV DBRDMT: CPT | Performed by: ORTHOPAEDIC SURGERY

## 2025-02-05 PROCEDURE — A23430 PR REPAIR BICEPS LONG TENDON

## 2025-02-05 PROCEDURE — 7100000009 HC PHASE TWO TIME - INITIAL BASE CHARGE: Performed by: ORTHOPAEDIC SURGERY

## 2025-02-05 PROCEDURE — 29823 SHO ARTHRS SRG XTNSV DBRDMT: CPT | Performed by: PHYSICIAN ASSISTANT

## 2025-02-05 PROCEDURE — 29826 SHO ARTHRS SRG DECOMPRESSION: CPT | Performed by: ORTHOPAEDIC SURGERY

## 2025-02-05 PROCEDURE — 29826 SHO ARTHRS SRG DECOMPRESSION: CPT | Performed by: PHYSICIAN ASSISTANT

## 2025-02-05 PROCEDURE — 29827 SHO ARTHRS SRG RT8TR CUF RPR: CPT | Performed by: PHYSICIAN ASSISTANT

## 2025-02-05 DEVICE — PROXIMAL TENODESIS IMPLANT SYSTEM REV: 0
Type: IMPLANTABLE DEVICE | Site: SHOULDER | Status: FUNCTIONAL
Brand: ARTHREX®

## 2025-02-05 DEVICE — TKL FBRTK SPDBRG IMP SYS W/ BC SWVLK
Type: IMPLANTABLE DEVICE | Site: SHOULDER | Status: FUNCTIONAL
Brand: ARTHREX®

## 2025-02-05 RX ORDER — OXYCODONE HYDROCHLORIDE 5 MG/1
10 TABLET ORAL EVERY 4 HOURS PRN
Status: DISCONTINUED | OUTPATIENT
Start: 2025-02-05 | End: 2025-02-05 | Stop reason: HOSPADM

## 2025-02-05 RX ORDER — FENTANYL CITRATE 50 UG/ML
50 INJECTION, SOLUTION INTRAMUSCULAR; INTRAVENOUS ONCE
Status: COMPLETED | OUTPATIENT
Start: 2025-02-05 | End: 2025-02-05

## 2025-02-05 RX ORDER — OXYCODONE HYDROCHLORIDE 5 MG/1
5 TABLET ORAL EVERY 4 HOURS PRN
Status: DISCONTINUED | OUTPATIENT
Start: 2025-02-05 | End: 2025-02-05 | Stop reason: HOSPADM

## 2025-02-05 RX ORDER — PROPOFOL 10 MG/ML
INJECTION, EMULSION INTRAVENOUS AS NEEDED
Status: DISCONTINUED | OUTPATIENT
Start: 2025-02-05 | End: 2025-02-05

## 2025-02-05 RX ORDER — ONDANSETRON HYDROCHLORIDE 2 MG/ML
4 INJECTION, SOLUTION INTRAVENOUS ONCE AS NEEDED
Status: DISCONTINUED | OUTPATIENT
Start: 2025-02-05 | End: 2025-02-05 | Stop reason: HOSPADM

## 2025-02-05 RX ORDER — ONDANSETRON HYDROCHLORIDE 2 MG/ML
INJECTION, SOLUTION INTRAVENOUS AS NEEDED
Status: DISCONTINUED | OUTPATIENT
Start: 2025-02-05 | End: 2025-02-05

## 2025-02-05 RX ORDER — CEFAZOLIN SODIUM 2 G/100ML
2 INJECTION, SOLUTION INTRAVENOUS ONCE
Status: COMPLETED | OUTPATIENT
Start: 2025-02-05 | End: 2025-02-05

## 2025-02-05 RX ORDER — ALBUTEROL SULFATE 0.83 MG/ML
2.5 SOLUTION RESPIRATORY (INHALATION) ONCE AS NEEDED
Status: DISCONTINUED | OUTPATIENT
Start: 2025-02-05 | End: 2025-02-05 | Stop reason: HOSPADM

## 2025-02-05 RX ORDER — OXYCODONE AND ACETAMINOPHEN 5; 325 MG/1; MG/1
1 TABLET ORAL EVERY 6 HOURS PRN
Qty: 28 TABLET | Refills: 0 | Status: SHIPPED | OUTPATIENT
Start: 2025-02-05 | End: 2025-02-12

## 2025-02-05 RX ORDER — LIDOCAINE HYDROCHLORIDE 10 MG/ML
INJECTION, SOLUTION EPIDURAL; INFILTRATION; INTRACAUDAL; PERINEURAL AS NEEDED
Status: DISCONTINUED | OUTPATIENT
Start: 2025-02-05 | End: 2025-02-05

## 2025-02-05 RX ORDER — ROCURONIUM BROMIDE 10 MG/ML
INJECTION, SOLUTION INTRAVENOUS AS NEEDED
Status: DISCONTINUED | OUTPATIENT
Start: 2025-02-05 | End: 2025-02-05

## 2025-02-05 RX ORDER — SODIUM CHLORIDE, SODIUM LACTATE, POTASSIUM CHLORIDE, CALCIUM CHLORIDE 600; 310; 30; 20 MG/100ML; MG/100ML; MG/100ML; MG/100ML
75 INJECTION, SOLUTION INTRAVENOUS CONTINUOUS
Status: DISCONTINUED | OUTPATIENT
Start: 2025-02-05 | End: 2025-02-05 | Stop reason: HOSPADM

## 2025-02-05 RX ORDER — ACETAMINOPHEN 325 MG/1
650 TABLET ORAL EVERY 4 HOURS PRN
Status: DISCONTINUED | OUTPATIENT
Start: 2025-02-05 | End: 2025-02-05 | Stop reason: HOSPADM

## 2025-02-05 RX ORDER — MIDAZOLAM HYDROCHLORIDE 1 MG/ML
2 INJECTION, SOLUTION INTRAMUSCULAR; INTRAVENOUS ONCE
Status: DISCONTINUED | OUTPATIENT
Start: 2025-02-05 | End: 2025-02-05 | Stop reason: HOSPADM

## 2025-02-05 RX ORDER — BACITRACIN ZINC 500 UNIT/G
OINTMENT IN PACKET (EA) TOPICAL AS NEEDED
Status: DISCONTINUED | OUTPATIENT
Start: 2025-02-05 | End: 2025-02-05 | Stop reason: HOSPADM

## 2025-02-05 RX ORDER — MIDAZOLAM HYDROCHLORIDE 1 MG/ML
2 INJECTION, SOLUTION INTRAMUSCULAR; INTRAVENOUS ONCE
Status: COMPLETED | OUTPATIENT
Start: 2025-02-05 | End: 2025-02-05

## 2025-02-05 RX ORDER — HYDROMORPHONE HYDROCHLORIDE 0.2 MG/ML
0.2 INJECTION INTRAMUSCULAR; INTRAVENOUS; SUBCUTANEOUS EVERY 5 MIN PRN
Status: DISCONTINUED | OUTPATIENT
Start: 2025-02-05 | End: 2025-02-05 | Stop reason: HOSPADM

## 2025-02-05 RX ORDER — GLYCOPYRROLATE 0.2 MG/ML
INJECTION INTRAMUSCULAR; INTRAVENOUS AS NEEDED
Status: DISCONTINUED | OUTPATIENT
Start: 2025-02-05 | End: 2025-02-05

## 2025-02-05 RX ADMIN — ROCURONIUM BROMIDE 50 MG: 10 INJECTION, SOLUTION INTRAVENOUS at 09:45

## 2025-02-05 RX ADMIN — SODIUM CHLORIDE, POTASSIUM CHLORIDE, SODIUM LACTATE AND CALCIUM CHLORIDE: 600; 310; 30; 20 INJECTION, SOLUTION INTRAVENOUS at 09:38

## 2025-02-05 RX ADMIN — DEXAMETHASONE SODIUM PHOSPHATE 4 MG: 4 INJECTION, SOLUTION INTRAMUSCULAR; INTRAVENOUS at 10:05

## 2025-02-05 RX ADMIN — MIDAZOLAM 2 MG: 1 INJECTION INTRAMUSCULAR; INTRAVENOUS at 08:40

## 2025-02-05 RX ADMIN — OXYCODONE HYDROCHLORIDE 10 MG: 5 TABLET ORAL at 12:02

## 2025-02-05 RX ADMIN — SUGAMMADEX 200 MG: 100 INJECTION, SOLUTION INTRAVENOUS at 11:18

## 2025-02-05 RX ADMIN — PROPOFOL 150 MG: 10 INJECTION, EMULSION INTRAVENOUS at 09:45

## 2025-02-05 RX ADMIN — CEFAZOLIN SODIUM 2 G: 2 INJECTION, SOLUTION INTRAVENOUS at 09:51

## 2025-02-05 RX ADMIN — ONDANSETRON 4 MG: 2 INJECTION INTRAMUSCULAR; INTRAVENOUS at 11:07

## 2025-02-05 RX ADMIN — ROCURONIUM BROMIDE 20 MG: 10 INJECTION, SOLUTION INTRAVENOUS at 10:31

## 2025-02-05 RX ADMIN — FENTANYL CITRATE 50 MCG: 0.05 INJECTION, SOLUTION INTRAMUSCULAR; INTRAVENOUS at 08:40

## 2025-02-05 RX ADMIN — FENTANYL CITRATE 50 MCG: 0.05 INJECTION, SOLUTION INTRAMUSCULAR; INTRAVENOUS at 09:45

## 2025-02-05 RX ADMIN — FENTANYL CITRATE 50 MCG: 0.05 INJECTION, SOLUTION INTRAMUSCULAR; INTRAVENOUS at 10:39

## 2025-02-05 RX ADMIN — LIDOCAINE HYDROCHLORIDE 5 ML: 10 INJECTION, SOLUTION EPIDURAL; INFILTRATION; INTRACAUDAL; PERINEURAL at 09:45

## 2025-02-05 RX ADMIN — GLYCOPYRROLATE 0.2 MG: 0.2 INJECTION INTRAMUSCULAR; INTRAVENOUS at 10:46

## 2025-02-05 ASSESSMENT — PAIN SCALES - GENERAL
PAINLEVEL_OUTOF10: 0 - NO PAIN
PAINLEVEL_OUTOF10: 7
PAINLEVEL_OUTOF10: 0 - NO PAIN
PAINLEVEL_OUTOF10: 2
PAINLEVEL_OUTOF10: 5 - MODERATE PAIN
PAINLEVEL_OUTOF10: 5 - MODERATE PAIN
PAINLEVEL_OUTOF10: 7
PAINLEVEL_OUTOF10: 0 - NO PAIN
PAINLEVEL_OUTOF10: 0 - NO PAIN
PAINLEVEL_OUTOF10: 3

## 2025-02-05 ASSESSMENT — COLUMBIA-SUICIDE SEVERITY RATING SCALE - C-SSRS
1. IN THE PAST MONTH, HAVE YOU WISHED YOU WERE DEAD OR WISHED YOU COULD GO TO SLEEP AND NOT WAKE UP?: NO
6. HAVE YOU EVER DONE ANYTHING, STARTED TO DO ANYTHING, OR PREPARED TO DO ANYTHING TO END YOUR LIFE?: NO
2. HAVE YOU ACTUALLY HAD ANY THOUGHTS OF KILLING YOURSELF?: NO

## 2025-02-05 ASSESSMENT — PAIN - FUNCTIONAL ASSESSMENT
PAIN_FUNCTIONAL_ASSESSMENT: 0-10

## 2025-02-05 ASSESSMENT — PAIN SCALES - PAIN ASSESSMENT IN ADVANCED DEMENTIA (PAINAD)
FACIALEXPRESSION: SMILING OR INEXPRESSIVE
TOTALSCORE: MEDICATION (SEE MAR)
CONSOLABILITY: NO NEED TO CONSOLE
BREATHING: NORMAL
TOTALSCORE: 0
BODYLANGUAGE: RELAXED

## 2025-02-05 ASSESSMENT — PAIN DESCRIPTION - DESCRIPTORS: DESCRIPTORS: ACHING

## 2025-02-05 ASSESSMENT — PAIN DESCRIPTION - LOCATION: LOCATION: SHOULDER

## 2025-02-05 ASSESSMENT — PAIN SCALES - WONG BAKER: WONGBAKER_NUMERICALRESPONSE: HURTS EVEN MORE

## 2025-02-05 ASSESSMENT — PAIN DESCRIPTION - ORIENTATION: ORIENTATION: RIGHT

## 2025-02-05 NOTE — ANESTHESIA PROCEDURE NOTES
Peripheral Block    Patient location during procedure: pre-op  Start time: 2/5/2025 8:43 AM  End time: 2/5/2025 8:46 AM  Reason for block: at surgeon's request and post-op pain management  Staffing  Performed: attending   Authorized by: Jamar Lyons DO    Performed by: Jamar Lyons DO  Preanesthetic Checklist  Completed: patient identified, IV checked, site marked, risks and benefits discussed, surgical consent, monitors and equipment checked, pre-op evaluation and timeout performed   Timeout performed at: 2/5/2025 8:39 AM  Peripheral Block  Patient position: sitting  Prep: ChloraPrep  Patient monitoring: heart rate, cardiac monitor and continuous pulse ox  Block type: interscalene  Laterality: right  Injection technique: single-shot  Guidance: ultrasound guided  Local infiltration: ropivacaine  Infiltration strength: 0.5 %  Dose: 20 mL  Needle  Needle gauge: 22 G  Needle length: 5 cm  Needle localization: ultrasound guidance  Assessment  Injection assessment: negative aspiration for heme, no paresthesia on injection, incremental injection and local visualized surrounding nerve on ultrasound  Paresthesia pain: none  Heart rate change: no  Slow fractionated injection: yes  Additional Notes  With 4mg Decadron

## 2025-02-05 NOTE — SIGNIFICANT EVENT
Denies any pain, fingers warm to touch, fingers pink and warm, cap refill brisk, 2+ right radial pulse, patient able  to move fingers to command

## 2025-02-05 NOTE — ANESTHESIA PROCEDURE NOTES
Airway  Date/Time: 2/5/2025 9:50 AM  Urgency: elective    Difficult airway    Staffing  Performed: ANNA   Authorized by: Beatriz Mcclain MD    Performed by: ANNA King  Patient location during procedure: OR    Indications and Patient Condition  Indications for airway management: anesthesia  Spontaneous Ventilation: absent  Sedation level: deep  Preoxygenated: yes  Patient position: sniffing  Mask difficulty assessment: 3 - difficult mask (inadequate, unstable or two providers) +/- NMBA  Planned trial extubation    Final Airway Details  Final airway type: endotracheal airway      Successful airway: ETT  Cuffed: yes   Successful intubation technique: direct laryngoscopy  Facilitating devices/methods: intubating stylet  Endotracheal tube insertion site: oral  Blade: [place]  Blade size: #4  ETT size (mm): 7.5  Cormack-Lehane Classification: grade III - view of epiglottis only  Placement verified by: capnometry   Measured from: lips  Number of attempts at approach: 2  Ventilation between attempts: 2 hand mask

## 2025-02-05 NOTE — POST-PROCEDURE NOTE
Patient arrived to PACU on simple mask, patient drowsy but easily arousable.  Right arm In a sling, fingers warm to touch, pink, cap refill brisk, patient able to move fingers.  Patient denies any pain.

## 2025-02-05 NOTE — ANESTHESIA POSTPROCEDURE EVALUATION
Patient: Av DUBOIS Arth    Procedure Summary       Date: 02/05/25 Room / Location: [facility] / [facility]    Anesthesia Start: 0938 Anesthesia Stop: 1130    Procedure: Repair Arthroscopy Rotator Cuff Shoulder, BICPES TENODESIS, MAJOR JOINT DEBRIDEMENT, DECOMPRESSION (Right: Shoulder) Diagnosis:       Complete tear of right rotator cuff, unspecified whether traumatic      (Complete tear of right rotator cuff, unspecified whether traumatic [M75.121])    Surgeons: Charles Gruber MD Responsible Provider: Beatriz Mcclain MD    Anesthesia Type: general, regional ASA Status: 3            Anesthesia Type: general, regional    Vitals Value Taken Time   /110 02/05/25 1127   Temp 35.6 °C (96.1 °F) 02/05/25 1127   Pulse 87 02/05/25 1127   Resp 17 02/05/25 1127   SpO2 97 % 02/05/25 1127       Anesthesia Post Evaluation    Patient location during evaluation: bedside  Patient participation: complete - patient participated  Level of consciousness: awake and alert  Pain management: adequate  Multimodal analgesia pain management approach  Airway patency: patent  Cardiovascular status: acceptable  Respiratory status: acceptable  Hydration status: acceptable  Postoperative Nausea and Vomiting: none        There were no known notable events for this encounter.

## 2025-02-05 NOTE — ANESTHESIA PREPROCEDURE EVALUATION
Patient: Av Francisco    Procedure Information       Date/Time: 02/05/25 0840    Procedure: Repair Arthroscopy Rotator Cuff Shoulder (arthrex) (Right: Shoulder)    Location: [facility] / [facility]    Surgeons: Charles Gruber MD            Relevant Problems   Anesthesia (within normal limits)      Cardiac   (+) Benign hypertension   (+) Coronary arteriosclerosis   (+) Hypercholesterolemia      Pulmonary (within normal limits)      Neuro (within normal limits)      GI   (+) Gastroesophageal reflux disease with esophagitis without hemorrhage      /Renal (within normal limits)      Liver (within normal limits)      Endocrine (within normal limits)      Hematology (within normal limits)      Musculoskeletal (within normal limits)      HEENT (within normal limits)      ID (within normal limits)      Skin (within normal limits)      GYN (within normal limits)     Past Medical History:   Diagnosis Date    Essential (primary) hypertension     Benign essential HTN    GERD (gastroesophageal reflux disease)     Hyperlipidemia      Past Surgical History:   Procedure Laterality Date    APPENDECTOMY      COLONOSCOPY      ORIF WRIST FRACTURE Left     OTHER SURGICAL HISTORY  05/22/2015    Wrist Surgery    TONSILLECTOMY  1969     Allergies   Allergen Reactions    Hornet Venom Hives     Per pt european hornet       Clinical information reviewed:   Tobacco  Allergies  Meds   Med Hx  Surg Hx   Fam Hx  Soc Hx        NPO Detail:  No data recorded     Physical Exam    Airway  Mallampati: II  TM distance: >3 FB  Neck ROM: full     Cardiovascular    Dental    Pulmonary    Abdominal            Anesthesia Plan    History of general anesthesia?: yes  History of complications of general anesthesia?: no    ASA 3     general and regional     intravenous induction   Postoperative administration of opioids is intended.  Trial extubation is planned.  Anesthetic plan and risks discussed with patient.

## 2025-02-05 NOTE — NURSING NOTE
"1240: Patient is c/o feeling \"queezy\". Patient tolerating saltine crackers and sips of ginger ale. Wife at bedside.   1250: patient vitals: HR 82, R 19, O2 sat 94%, and /75.   1300: patient states that he is feeling better and wants to proceed to discharge to home.   "

## 2025-02-05 NOTE — OP NOTE
Repair Arthroscopy Rotator Cuff Shoulder, BICPES TENODESIS, MAJOR JOINT DEBRIDEMENT, DECOMPRESSION (R) Operative Note     Date: 2025  OR Location: [facility]    Name: Av Francisco, : 1965, Age: 59 y.o., MRN: [id number], Sex: male    Diagnosis  Pre-op Diagnosis      * Complete tear of right rotator cuff, unspecified whether traumatic [M75.121] Post-op Diagnosis     * Complete tear of right rotator cuff, unspecified whether traumatic [M75.121]     Procedures  Repair Arthroscopy Rotator Cuff Shoulder, BICPES TENODESIS, MAJOR JOINT DEBRIDEMENT, DECOMPRESSION  [id number] - HI SURGICAL ARTHROSCOPY SHOULDER W/ROTATOR CUFF RPR  1.  Right shoulder arthroscopic double row rotator cuff repair  #2 right shoulder open biceps tenodesis  #3 right shoulder arthroscopic major joint debridement of labral tear synovitis upper subscapular fraying and undersurface infraspinatus tear  #4 right shoulder arthroscopic anterior acromioplasty and Co. planing undersurface clavicle spur    Surgeons      * Charles Gruber - Primary    Resident/Fellow/Other Assistant:  Surgeons and Role:  * No surgeons found with a matching role *  Lexi Pereira PA-C  Staff:   Circulator: Pamela  Scrub Person: Joyce  Scrub Person: Key  Scrub Person: Debo    Anesthesia Staff: Anesthesiologist: Beatriz Mcclain MD  C-AA: ANNA King    Procedure Summary  Anesthesia: Regional, General  ASA: III  Estimated Blood Loss: 5mL  Intra-op Medications:   Administrations occurring from 0840 to 1045 on 25:   Medication Name Total Dose   EPINEPHrine (Adrenalin) 1 mg/mL 1 mg in sodium chloride 0.9 % 3,000 mL irrigation 2 mL   dexAMETHasone (Decadron) injection 4 mg/mL 4 mg   LR bolus Cannot be calculated   lidocaine PF (Xylocaine-MPF) local injection 1 % 5 mL   propofol (Diprivan) injection 10 mg/mL 150 mg   rocuronium (ZeMuron) 50 mg/5 mL injection 70 mg   ceFAZolin (Ancef) 2 g in dextrose (iso)  mL 2 g   fentaNYL PF (Sublimaze) injection 50  mcg 100 mcg              Anesthesia Record               Intraprocedure I/O Totals       None           Specimen: No specimens collected              Drains and/or Catheters: * None in log *    Tourniquet Times:         Implants:  Implants       Type Name Action Serial No.      Implant KIT, IMPLANT SYSTEM, PROXIMAL TENODESIS - [id number] Implanted       KNOTLESS FIBERTAK SPEEDBRIDGE IMPLANT SYSTEM Implanted               Large rotator cuff tear with joint synovitis impinging lesion anterior acromion and undersurface clavicle with upper subscapular fraying large labral tear that necessitated the acromioplasty and with the biceps tear the biceps tenodesis and necessitated the major joint debridement    Indications: Av Francisco is an 59 y.o. male who is having surgery for Complete tear of right rotator cuff, unspecified whether traumatic [M75.121]. Pleasant patient understands risk benefits alternatives such as nerve artery tendon damage infection continued pain possibly for future surgery and infection.  Understands with a large tear there is a risk of recurrent tear.  Understands a long recovery phase had a long discussion about all the above procedures.  Informed consent obtained wished to proceed talked about sling usage and follow-up in therapy.  Wish to proceed    The patient was seen in the preoperative area. The risks, benefits, complications, treatment options, non-operative alternatives, expected recovery and outcomes were discussed with the patient. The possibilities of reaction to medication, pulmonary aspiration, injury to surrounding structures, bleeding, recurrent infection, the need for additional procedures, failure to diagnose a condition, and creating a complication requiring transfusion or operation were discussed with the patient. The patient concurred with the proposed plan, giving informed consent.  The site of surgery was properly noted/marked if necessary per policy. The patient has been  actively warmed in preoperative area. Preoperative antibiotics have been ordered and given within 1 hours of incision. Venous thrombosis prophylaxis have been ordered including bilateral sequential compression devices    Procedure Details: Pleasant patient brought the operating room and after sterilely prepping draping performing a timeout he was placed in the left lateral decubitus position well-padded on beanbag with an axillary roll we immediately saw the large labral tear the biceps tear that was significant joint synovitis undersurface tearing of the infraspinatus and a full-thickness tear of the supraspinatus we did a major joint debridement tenotomized and the biceps for later tenodesis debriding out the interval upper subscapularis was frayed but no severe subscapular tear we debrided out the joint fully with the large labral tear in addition this was all taken back to a stable edge no severe cartilage damage.  At this point we turned our attention to the subacromial space and we did a significant anterior chromium plasty of a large spur and a large spur coming from the inferior clavicle and we did Co. planing of this area.  He was fully debrided and decompressed in this area at this point we did a double row arthroscopic suture bridge repair with using the lateral anchors with 2 extra cinch stitches.  This brought the cuff down nicely.  At this point we copious irrigated out the subacromial space we made a 4 cm subpectoral incision easily harvesting the biceps tendon performed a unicortical biceps tenodesis under appropriate tension.  No complications we copious irrigated we closed wounds patient was placed in abduction pillow sling there were no complications Lexi Pereira PA-C acted as a surgical assistant during this case and her assistance greatly reduced operative time and aided in performance of the case  Complications:  None; patient tolerated the procedure well.    Disposition: PACU -  hemodynamically stable.  Condition: stable                 Additional Details: 0    Attending Attestation: I performed the procedure.    Charles Gruber  Phone Number: [phone]

## 2025-02-05 NOTE — POST-PROCEDURE NOTE
Hand off report given to Monica Chen RN.  Right arm In a sling, fingers warm to touch, pink, cap refill brisk, patient able to move fingers.  Patient denies any pain.   Patient now admitting to pain in left arm from elbow to hand, rates pain 7/10.  Medicated patient with Oxycodone 10 mg IR.  Vitals stable on transfer to Phase 2.

## 2025-02-05 NOTE — POST-PROCEDURE NOTE
Patietn woke early into recovery, removed simple mask, patient remains alert at times drowsy.  Patient tolerating sips of water and ice chips.  Right arm In a sling, fingers warm to touch, pink, cap refill brisk, patient able to move fingers.  Patient denies any pain.

## 2025-02-07 ENCOUNTER — EVALUATION (OUTPATIENT)
Dept: PHYSICAL THERAPY | Facility: HOSPITAL | Age: 60
End: 2025-02-07
Payer: COMMERCIAL

## 2025-02-07 DIAGNOSIS — M75.121 COMPLETE TEAR OF RIGHT ROTATOR CUFF, UNSPECIFIED WHETHER TRAUMATIC: Primary | ICD-10-CM

## 2025-02-07 DIAGNOSIS — M75.21 BICEPS TENDINITIS OF RIGHT UPPER EXTREMITY: ICD-10-CM

## 2025-02-07 PROCEDURE — 97110 THERAPEUTIC EXERCISES: CPT | Mod: GP | Performed by: PHYSICAL THERAPIST

## 2025-02-07 PROCEDURE — 97161 PT EVAL LOW COMPLEX 20 MIN: CPT | Mod: GP | Performed by: PHYSICAL THERAPIST

## 2025-02-07 ASSESSMENT — PAIN SCALES - GENERAL
PAINLEVEL_OUTOF10: 6
PAINLEVEL_OUTOF10: 3

## 2025-02-07 ASSESSMENT — ENCOUNTER SYMPTOMS
DEPRESSION: 0
OCCASIONAL FEELINGS OF UNSTEADINESS: 0
LOSS OF SENSATION IN FEET: 0

## 2025-02-07 ASSESSMENT — PAIN DESCRIPTION - DESCRIPTORS: DESCRIPTORS: ACHING

## 2025-02-07 ASSESSMENT — PAIN - FUNCTIONAL ASSESSMENT: PAIN_FUNCTIONAL_ASSESSMENT: 0-10

## 2025-02-07 NOTE — PROGRESS NOTES
"  Physical Therapy  Physical Therapy Evaluation and Treatment     Patient Name: Av Francisco  MRN: [id number]  Today's Date: 2/7/2025  Time Calculation  Start Time: 0950  Stop Time: 1025  Time Calculation (min): 35 min    PT Evaluation Time Entry  PT Evaluation (Low) Time Entry: 25  PT Therapeutic Procedures Time Entry  Therapeutic Exercise Time Entry: 10        Insurance:  Visit number: 1 of 12  Authorization info: no auth required  Payor: [employer] [facility] / Plan: [employer] / Product Type: *No Product type* /     Current Problem  1. Complete tear of right rotator cuff, unspecified whether traumatic  Referral to Physical Therapy    Follow Up In Physical Therapy      2. Biceps tendinitis of right upper extremity  Referral to Physical Therapy    Follow Up In Physical Therapy        Problem List Items Addressed This Visit             ICD-10-CM    Biceps tendinitis of right upper extremity M75.21    Relevant Orders    Follow Up In Physical Therapy    Complete tear of right rotator cuff - Primary M75.121    Relevant Orders    Follow Up In Physical Therapy       General:  General  Reason for Referral: s/p right shoulder rotator cuff with bicep tenodesis  Referred By: Dr. Gruber  Past Medical History Relevant to Rehab: benign HTN, biceps tendinitis of right upper extremity, deltoid tendinitis of right shoulder, complete tear of right rotator cuff.  Preferred Learning Style: visual, written    Precautions:   Precautions  UE Weight Bearing Status: Right Non-Weight Bearing  Post-Surgical Precautions: Right shoulder precautions  Precautions Comment: Dr. Gruber protocol in media/DWP: 0-2 weeks (2/19) post-op: elbow aarom, 0-6 (3/19)weeks post-op: shoulder PROM: FF as tolerated, ER to 30, IR to body.    Medical History Form: Reviewed (scanned into chart)    Subjective:   Subjective   Chief Complaint: Patient is a 59 year old male who presents to clinic status post right \"Repair Arthroscopy Rotator Cuff " "Shoulder, BICPES TENODESIS, MAJOR JOINT DEBRIDEMENT, DECOMPRESSION\" on 2/5/2025. Patient has a prior medical history including: benign HTN, biceps tendinitis of right upper extremity, deltoid tendinitis of right shoulder, complete tear of right rotator cuff.  Onset Date: 2/5/2025  AVA: surgery    Current Condition:   Better    Pain:  Pain Assessment: 0-10  0-10 (Numeric) Pain Score: 3  Pain Type: Surgical pain  Pain Location: Shoulder  Pain Orientation: Right  Pain Descriptors: Aching  Highest: 10/10 pain  Lowest: 3/10 pain  Aggravating Factors:  Lying  Relieving Factors:  Ice    Relevant Information (PMH & Previous Tests/Imaging):   Pre-op right shoulder MRI on 11/20/2024:  IMPRESSION:  1. Full-thickness full width retracted supraspinatus tear to the level of the glenohumeral joint.  2. Moderate anterior infraspinatus tendinosis without tear.  3. High-grade undersurface tear observed involving the superior most subscapularis with tendinosis of the rest of the fibers  4. 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    Red Flags: Do you have any of the following? No  Fever/chills, unexplained weight changes, dizziness/fainting, unexplained change in bowel or bladder functions, unexplained malaise or muscle weakness, night pain/sweats, numbness or tingling    Objective:  Objective     Shoulder  Functional Rating Scale  Quick Dash: 93.18    Shoulder AROM  Shoulder AROM WFL:  (RUE not tested)  L Shoulder flexion: (180°): WFL  L Shoulder abduction: (180°): WFL  L shoulder " ER: (90°): WFL  L shoulder IR: (70°): WFL    Shoulder PROM  R shoulder flexion: (180°): <25%  R shoulder abduction: (180°): <25%  R shoulder ER: (90°): 0  R shoulder IR: (70°): not tested    Shoulder Strength  Shoulder Strength WFL:  (RUE not tested due to recent surgery)  L shoulder flexion: (5/5): 5/5  L shoulder abduction: (5/5): 5/5  L shoulder ER: (5/5): 5/5  L shoulder IR: (5/5): 5/5    Elbow PROM   R elbow flexion: (140°): WFL  L elbow flexion: (140°): WFL  R elbow extension:(0°): -20  L elbow extension: (0°): 0    Elbow Strength  Elbow Strength WFL:  (right elbow not tested due to recent surgery)  L elbow flexion: (5/5): 5/5  L elbow extension: (5/5): 5/5    Posture: shoulder rounded forward    Palpation: not tested     Joint Mobility: hypomobile GH joint    Observation: surgical bandage intact        Special Tests  Not tested due to recent surgery.    Outcome Measures:  Other Measures  Disability of Arm Shoulder Hand (DASH): 93.18     Treatment Performed:  Therapeutic Exercise  Therapeutic Exercise Performed: Yes    EDUCATION:   Individual(s) Educated: patient   Education Provided: Home exercise program, plan of care, activity modifications, pain management, and injury pathology  Handout(s) Provided: Scanned into chart  Home Program: Access Code: [email or web address]  Risk and Benefits Discussed with Patient/Caregiver/Other: Yes   Patient/Caregiver Demonstrated Understanding: Yes   Plan of Care Discussed and Agreed Upon: Yes   Patient Response to Education: Patient/Caregiver verbalized understanding of information, Patient/Caregiver performed return demonstration of exercises/activities, and Patient/Caregiver asked appropriate questions    Assessment: Patient presents with decreased right shoulder and elbow range of motion resulting in limited participation in pain-free ADLs and inability to perform at their prior level of function. Patient was instructed in hand and forearm range of motion, elbow active assisted range  of motion, and progression of pendulums. Patient was instructed in just letting his arm hang with him standing upright and progressing to leaning over with his arm dangling. Patient demonstrated tolerance to only standing upright pendulums. Patient tolerated minimal passive range of motion to his shoulder into flexion in plane of the scapula. Skilled PT warranted to address the above stated impairments, so the patient can perform FA's without increased pain or difficulty.    PT Assessment Results: Decreased strength, Decreased range of motion, Pain, Orthopedic restrictions  Rehab Prognosis: Good  Evaluation/Treatment Tolerance: Patient limited by pain, Patient tolerated treatment well    Complexity: low    Plan:  Treatment/Interventions: Education/ Instruction, Manual therapy, Neuromuscular re-education, Therapeutic exercises, Therapeutic activities  PT Plan: Skilled PT  PT Frequency: 2 times per week  Duration: 6 weeks  Onset Date: 02/05/25  Certification Period Start Date: 02/07/25  Certification Period End Date: 03/21/25  Number of Treatments Authorized: 1 of 12 (20 visits per year)  Rehab Potential: Good  Plan of Care Agreement: Patient    Goals: Set and discussed today  Active       PT Problem       Patient will achieve passive range of motion right shoulder flexion of at least 160 degrees       Start:  02/07/25    Expected End:  03/21/25            Patient will achieve passive range of motion right shoulder abduction of at least 160 degrees       Start:  02/07/25    Expected End:  03/21/25            Patient will achieve passive range of motion right shoulder external rotation of 30 degrees in 45 degrees abd       Start:  02/07/25    Expected End:  03/21/25            Patient will achieve right shoulder flexion strength of at least 4+/5       Start:  02/07/25    Expected End:  05/30/25            Patient will achieve right shoulder abduction strength of at least 4+/5       Start:  02/07/25    Expected End:   05/30/25            Patient will achieve right shoulder external rotation strength of at least 4+/5 in neutral       Start:  02/07/25    Expected End:  05/30/25            Patient will achieve right shoulder internal rotation strength of at least 4+/5 in neutral       Start:  02/07/25    Expected End:  05/30/25            Patient will achieve right elbow flexion strength of at least 4+/5       Start:  02/07/25    Expected End:  05/02/25            Patient will achieve right elbow extension strength of at least 4+/5       Start:  02/07/25    Expected End:  05/02/25            Patient will demonstrate independence in home program for support of progression       Start:  02/07/25    Expected End:  03/21/25            Patient will report pain of no more than 2/10 demonstrating a reduction of overall pain       Start:  02/07/25    Expected End:  03/21/25            Patient will show a significant change in Quick Dash (93.18 to 85.18) patient reported outcome tool to demonstrate subjective imporovement       Start:  02/07/25    Expected End:  03/21/25                Plan of care was developed with input and agreement by the patient    Ambulatory Screenings Summary       Screening  Frequency  Date Last Completed   Spiritual and Cultural Beliefs   Screening each visit or episode of care 2/7/2025   Falls Risk Screening every ambulatory visit 2/7/2025 12:17 PM   Pain Screening annually at primary care visit     Domestic Violence screening annually at primary care visit 2/7/2025   Depression Screening annually in the primary care setting 7/22/2024   Suicide Risk Screening annually in the primary care setting 2/5/2025   Nutrition and Food Insecurity   Screening at least annually at primary care visit     Key Learner annually in the primary care setting 2/7/2025   Drug Screen  2/5/2025  7:57 AM   Alcohol Screen  2/5/2025  7:57 AM   Advance Directive  12/16/2024       Seth Huntley, PT

## 2025-02-10 ENCOUNTER — TREATMENT (OUTPATIENT)
Dept: PHYSICAL THERAPY | Facility: HOSPITAL | Age: 60
End: 2025-02-10
Payer: COMMERCIAL

## 2025-02-10 DIAGNOSIS — M75.121 COMPLETE TEAR OF RIGHT ROTATOR CUFF, UNSPECIFIED WHETHER TRAUMATIC: ICD-10-CM

## 2025-02-10 DIAGNOSIS — M75.21 BICEPS TENDINITIS OF RIGHT UPPER EXTREMITY: ICD-10-CM

## 2025-02-10 PROCEDURE — 97140 MANUAL THERAPY 1/> REGIONS: CPT | Mod: GP,CQ

## 2025-02-10 PROCEDURE — 97110 THERAPEUTIC EXERCISES: CPT | Mod: GP,CQ

## 2025-02-10 ASSESSMENT — PAIN - FUNCTIONAL ASSESSMENT: PAIN_FUNCTIONAL_ASSESSMENT: 0-10

## 2025-02-10 ASSESSMENT — PAIN SCALES - GENERAL: PAINLEVEL_OUTOF10: 4

## 2025-02-10 NOTE — PROGRESS NOTES
Physical Therapy Treatment    Patient Name: Av Francisco  MRN: [id number]  Encounter Date: 2/10/2025  Time Calculation  Start Time: 1045  Stop Time: 1127  Time Calculation (min): 42 min        PT Therapeutic Procedures Time Entry  Manual Therapy Time Entry: 32  Therapeutic Exercise Time Entry: 10    Current Problem  Problem List Items Addressed This Visit             ICD-10-CM    Biceps tendinitis of right upper extremity M75.21    Complete tear of right rotator cuff M75.121     1. Complete tear of right rotator cuff, unspecified whether traumatic  Follow Up In Physical Therapy      2. Biceps tendinitis of right upper extremity  Follow Up In Physical Therapy          General  Reason for Referral: s/p right shoulder rotator cuff with bicep tenodesis  Referred By: Dr. Gruber  Past Medical History Relevant to Rehab: benign HTN, biceps tendinitis of right upper extremity, deltoid tendinitis of right shoulder, complete tear of right rotator cuff.  Preferred Learning Style: visual, written  General Comment: Pt wearing AB sling, tyshawn 100% of the time.    Subjective   Current Condition:   Better  Patient reports feeling less pain and tightness today and is finally sleeping.  Reports sleeping in bed propped up with sling,     Performing HEP?: Yes    Precautions  Precautions  UE Weight Bearing Status: Right Non-Weight Bearing  Post-Surgical Precautions: Right shoulder precautions  Precautions Comment: Dr. Gruber protocol in media/DWP: 0-2 weeks (2/19) post-op: elbow aarom, 0-6 (3/19)weeks post-op: shoulder PROM: FF as tolerated, ER to 30, IR to body.  Pain  Pain Assessment: 0-10  0-10 (Numeric) Pain Score: 4  Pain Type: Surgical pain, Acute pain  Pain Location: Shoulder  Pain Orientation: Right  Response to Interventions: Decrease in pain (PROM)    Objective     Treatments:    Therapeutic Exercise  Therapeutic Exercise Performed: Yes  Therapeutic Exercise Activity 1: 2x10 Pron/Sup  Therapeutic Exercise Activity 2: 2x10  AAROM bicep flex  Therapeutic Exercise Activity 3: 3x30 sec standing arm hang  Therapeutic Exercise Activity 4: 2x15 hand          Manual Therapy  Manual Therapy Performed: Yes  Manual Therapy Activity 1: PROM R shoulder and bicep per protocol within pain free limits         EDUCATION:   Individual(s) Educated: Patient  Education Provided: Same as previous  Handout(s) Provided: Scanned into chart  Home Program: same as previous with education on ice   Risk and Benefits Discussed with Patient/Caregiver/Other: Yes   Patient/Caregiver Demonstrated Understanding: Yes   Patient Response to Education: Patient/Caregiver verbalized understanding of information, Patient/Caregiver performed return demonstration of exercises/activities, and Patient/Caregiver asked appropriate questions    Assessment: Pt reported no increased pain post session with verbalized understanding of use of ice and its benefits in post op rehab.   Pt able to tolerate approximately IR to 60, ER to 10 in scaption, flexion to 45 with elbow bent and abd 30 with elbow bent.  Able to tolerate AAROM elbow flexion and ext to 0 without increased discomfort.      PT Assessment  PT Assessment Results: Decreased strength, Decreased range of motion, Pain, Orthopedic restrictions  Rehab Prognosis: Good    Plan: Continue with POC. Continue with core stability, UE strengthening, ROM, flexibility, and scapular stability, so the patient can perform FA's without increased pain or difficulty.  Focused on PROM within protocol and pain free limits.     OP PT Plan  Treatment/Interventions: Education/ Instruction, Manual therapy, Neuromuscular re-education, Therapeutic exercises, Therapeutic activities  PT Plan: Skilled PT  PT Frequency: 2 times per week  Duration: 6 weeks  Onset Date: 02/05/25  Certification Period Start Date: 02/07/25  Certification Period End Date: 03/21/25  Number of Treatments Authorized: 2 of 12 (20 visits per year)  Rehab Potential: Good  Plan of  Care Agreement: Patient  Payor: [employer] [facility] / Plan: [employer] / Product Type: *No Product type* /     Visit count this episode: 2 visits    Goals:  Active       PT Problem       Patient will achieve passive range of motion right shoulder flexion of at least 160 degrees (Progressing)       Start:  02/07/25    Expected End:  03/21/25            Patient will achieve passive range of motion right shoulder abduction of at least 160 degrees (Progressing)       Start:  02/07/25    Expected End:  03/21/25            Patient will achieve passive range of motion right shoulder external rotation of 30 degrees in 45 degrees abd (Progressing)       Start:  02/07/25    Expected End:  03/21/25            Patient will achieve right shoulder flexion strength of at least 4+/5       Start:  02/07/25    Expected End:  05/30/25            Patient will achieve right shoulder abduction strength of at least 4+/5       Start:  02/07/25    Expected End:  05/30/25            Patient will achieve right shoulder external rotation strength of at least 4+/5 in neutral       Start:  02/07/25    Expected End:  05/30/25            Patient will achieve right shoulder internal rotation strength of at least 4+/5 in neutral       Start:  02/07/25    Expected End:  05/30/25            Patient will achieve right elbow flexion strength of at least 4+/5       Start:  02/07/25    Expected End:  05/02/25            Patient will achieve right elbow extension strength of at least 4+/5       Start:  02/07/25    Expected End:  05/02/25            Patient will demonstrate independence in home program for support of progression       Start:  02/07/25    Expected End:  03/21/25            Patient will report pain of no more than 2/10 demonstrating a reduction of overall pain (Progressing)       Start:  02/07/25    Expected End:  03/21/25            Patient will show a significant change in Quick Dash (93.18 to 85.18) patient reported  outcome tool to demonstrate subjective imporovement       Start:  02/07/25    Expected End:  03/21/25                 Lorenza Irene, PTA

## 2025-02-11 ENCOUNTER — APPOINTMENT (OUTPATIENT)
Dept: PHYSICAL THERAPY | Facility: HOSPITAL | Age: 60
End: 2025-02-11
Payer: COMMERCIAL

## 2025-02-11 ENCOUNTER — TELEPHONE (OUTPATIENT)
Dept: ORTHOPEDIC SURGERY | Facility: CLINIC | Age: 60
End: 2025-02-11
Payer: COMMERCIAL

## 2025-02-11 DIAGNOSIS — M75.101 TEAR OF RIGHT ROTATOR CUFF, UNSPECIFIED TEAR EXTENT, UNSPECIFIED WHETHER TRAUMATIC: Primary | ICD-10-CM

## 2025-02-11 RX ORDER — OXYCODONE AND ACETAMINOPHEN 5; 325 MG/1; MG/1
1 TABLET ORAL EVERY 6 HOURS PRN
Qty: 20 TABLET | Refills: 0 | Status: SHIPPED | OUTPATIENT
Start: 2025-02-11 | End: 2025-02-16

## 2025-02-11 RX ORDER — ONDANSETRON 4 MG/1
4 TABLET, ORALLY DISINTEGRATING ORAL EVERY 8 HOURS PRN
Qty: 21 TABLET | Refills: 0 | Status: SHIPPED | OUTPATIENT
Start: 2025-02-11 | End: 2025-02-18

## 2025-02-11 NOTE — TELEPHONE ENCOUNTER
Wife of patient called. She said that Av is running out of pain meds (oxycodone). He has 5 left. She also mentioned that the patient gets nauseated when he takes the medicine wether with food or without food. Her name is Sandy and she can be reached at [phone].

## 2025-02-12 ENCOUNTER — TREATMENT (OUTPATIENT)
Dept: PHYSICAL THERAPY | Facility: HOSPITAL | Age: 60
End: 2025-02-12
Payer: COMMERCIAL

## 2025-02-12 DIAGNOSIS — M75.21 BICEPS TENDINITIS OF RIGHT UPPER EXTREMITY: ICD-10-CM

## 2025-02-12 DIAGNOSIS — I10 BENIGN HYPERTENSION: ICD-10-CM

## 2025-02-12 DIAGNOSIS — M75.121 COMPLETE TEAR OF RIGHT ROTATOR CUFF, UNSPECIFIED WHETHER TRAUMATIC: ICD-10-CM

## 2025-02-12 PROCEDURE — 97110 THERAPEUTIC EXERCISES: CPT | Mod: GP,CQ

## 2025-02-12 RX ORDER — LOSARTAN POTASSIUM 50 MG/1
50 TABLET ORAL DAILY
Qty: 90 TABLET | Refills: 0 | Status: SHIPPED | OUTPATIENT
Start: 2025-02-12

## 2025-02-12 RX ORDER — AMLODIPINE BESYLATE 10 MG/1
10 TABLET ORAL DAILY
Qty: 90 TABLET | Refills: 0 | Status: SHIPPED | OUTPATIENT
Start: 2025-02-12

## 2025-02-12 ASSESSMENT — PAIN - FUNCTIONAL ASSESSMENT: PAIN_FUNCTIONAL_ASSESSMENT: 0-10

## 2025-02-12 ASSESSMENT — PAIN SCALES - GENERAL: PAINLEVEL_OUTOF10: 4

## 2025-02-12 NOTE — PROGRESS NOTES
Physical Therapy Treatment    Patient Name: Av Francisco  MRN: [id number]  Today's Date: 2/12/2025  Time Calculation  Start Time: 0917  Stop Time: 0956  Time Calculation (min): 39 min        PT Therapeutic Procedures Time Entry  Therapeutic Exercise Time Entry: 39                Current Problem  1. Complete tear of right rotator cuff, unspecified whether traumatic  Follow Up In Physical Therapy      2. Biceps tendinitis of right upper extremity  Follow Up In Physical Therapy          General  Reason for Referral: s/p right shoulder rotator cuff with bicep tenodesis  Referred By: Dr. Gruber  Past Medical History Relevant to Rehab: benign HTN, biceps tendinitis of right upper extremity, deltoid tendinitis of right shoulder, complete tear of right rotator cuff.  Preferred Learning Style: visual, written  General Comment: Pt wearing AB sling, tyshawn 100% of the time.    Subjective   Current Condition:   Better  Patient reports he is sleeping better, up to 2-3 hours at a time. Took pain medication about 30 minutes prior to PT today.     Performing HEP?: Yes    Precautions  Precautions  UE Weight Bearing Status: Right Non-Weight Bearing  Post-Surgical Precautions: Right shoulder precautions  Precautions Comment: Dr. Gruber protocol in media/DWP: 0-2 weeks (2/19) post-op: elbow aarom, 0-6 (3/19)weeks post-op: shoulder PROM: FF as tolerated, ER to 30, IR to body.  Pain  Pain Assessment: 0-10  0-10 (Numeric) Pain Score: 4  Pain Location: Shoulder  Pain Orientation: Right    Objective         Shoulder PROM: R ER 17      Treatments:    Therapeutic Exercise  Therapeutic Exercise Performed: Yes  Therapeutic Exercise Activity 1: PROM scapular retraction/protraction/elevation/depression  Therapeutic Exercise Activity 2: 2x10 AAROM bicep flex  Therapeutic Exercise Activity 3: Pendulum fwd/back x15  Therapeutic Exercise Activity 4: 2x15 hand   Therapeutic Exercise Activity 5: PROM R shoulder flexion, ER, and IR to  "body  Therapeutic Exercise Activity 6: R UT stretch 30\"                     EDUCATION:   Individual(s) Educated: Patient  Education Provided: HEP   Risk and Benefits Discussed with Patient/Caregiver/Other: Yes   Patient/Caregiver Demonstrated Understanding: Yes   Patient Response to Education: Patient/Caregiver verbalized understanding of information    Assessment: Pt able to relax the R UE to allow PROM with minimal increase with pain. Pt demonstrating improvement with ER measurement taken today. Pt now able to perform pendulum exercise with some pain after multiple repetitions, which diminished once completed.   PT Assessment  PT Assessment Results: Decreased strength, Decreased range of motion, Pain, Orthopedic restrictions  Rehab Prognosis: Good    Plan: Continue to progress current POC as tolerated to facilitate ability to perform functional activities.   OP PT Plan  Treatment/Interventions: Education/ Instruction, Manual therapy, Neuromuscular re-education, Therapeutic exercises, Therapeutic activities  PT Plan: Skilled PT  PT Frequency: 2 times per week  Duration: 6 weeks  Onset Date: 02/05/25  Certification Period Start Date: 02/07/25  Certification Period End Date: 03/21/25  Number of Treatments Authorized: 3 of 12 (20 visits per year)  Rehab Potential: Good  Plan of Care Agreement: Patient    Goals:  Active       PT Problem       Patient will achieve passive range of motion right shoulder flexion of at least 160 degrees (Progressing)       Start:  02/07/25    Expected End:  03/21/25            Patient will achieve passive range of motion right shoulder abduction of at least 160 degrees (Progressing)       Start:  02/07/25    Expected End:  03/21/25            Patient will achieve passive range of motion right shoulder external rotation of 30 degrees in 45 degrees abd (Progressing)       Start:  02/07/25    Expected End:  03/21/25            Patient will achieve right shoulder flexion strength of at least " 4+/5       Start:  02/07/25    Expected End:  05/30/25            Patient will achieve right shoulder abduction strength of at least 4+/5       Start:  02/07/25    Expected End:  05/30/25            Patient will achieve right shoulder external rotation strength of at least 4+/5 in neutral       Start:  02/07/25    Expected End:  05/30/25            Patient will achieve right shoulder internal rotation strength of at least 4+/5 in neutral       Start:  02/07/25    Expected End:  05/30/25            Patient will achieve right elbow flexion strength of at least 4+/5       Start:  02/07/25    Expected End:  05/02/25            Patient will achieve right elbow extension strength of at least 4+/5       Start:  02/07/25    Expected End:  05/02/25            Patient will demonstrate independence in home program for support of progression       Start:  02/07/25    Expected End:  03/21/25            Patient will report pain of no more than 2/10 demonstrating a reduction of overall pain (Progressing)       Start:  02/07/25    Expected End:  03/21/25            Patient will show a significant change in Quick Dash (93.18 to 85.18) patient reported outcome tool to demonstrate subjective imporovement       Start:  02/07/25    Expected End:  03/21/25                 Niels Meredith, PTA

## 2025-02-17 ENCOUNTER — APPOINTMENT (OUTPATIENT)
Dept: ORTHOPEDIC SURGERY | Facility: CLINIC | Age: 60
End: 2025-02-17
Payer: COMMERCIAL

## 2025-02-17 ENCOUNTER — TREATMENT (OUTPATIENT)
Dept: PHYSICAL THERAPY | Facility: HOSPITAL | Age: 60
End: 2025-02-17
Payer: COMMERCIAL

## 2025-02-17 DIAGNOSIS — M75.101 TEAR OF RIGHT ROTATOR CUFF, UNSPECIFIED TEAR EXTENT, UNSPECIFIED WHETHER TRAUMATIC: Primary | ICD-10-CM

## 2025-02-17 DIAGNOSIS — M75.21 BICEPS TENDINITIS OF RIGHT UPPER EXTREMITY: ICD-10-CM

## 2025-02-17 DIAGNOSIS — M75.121 COMPLETE TEAR OF RIGHT ROTATOR CUFF, UNSPECIFIED WHETHER TRAUMATIC: ICD-10-CM

## 2025-02-17 PROCEDURE — 1036F TOBACCO NON-USER: CPT | Performed by: ORTHOPAEDIC SURGERY

## 2025-02-17 PROCEDURE — 97110 THERAPEUTIC EXERCISES: CPT | Mod: GP,CQ

## 2025-02-17 PROCEDURE — 99024 POSTOP FOLLOW-UP VISIT: CPT | Performed by: ORTHOPAEDIC SURGERY

## 2025-02-17 ASSESSMENT — PAIN - FUNCTIONAL ASSESSMENT: PAIN_FUNCTIONAL_ASSESSMENT: 0-10

## 2025-02-17 ASSESSMENT — PAIN SCALES - GENERAL
PAINLEVEL_OUTOF10: 0 - NO PAIN
PAINLEVEL_OUTOF10: 3

## 2025-02-17 NOTE — PROGRESS NOTES
Physical Therapy Treatment    Patient Name: Av Francisco  MRN: [id number]  Encounter Date: 2/17/2025  Time Calculation  Start Time: 0915  Stop Time: 0958  Time Calculation (min): 43 min        PT Therapeutic Procedures Time Entry  Therapeutic Exercise Time Entry: 43        Current Problem  Problem List Items Addressed This Visit             ICD-10-CM    Biceps tendinitis of right upper extremity M75.21    Complete tear of right rotator cuff M75.121     1. Complete tear of right rotator cuff, unspecified whether traumatic  Follow Up In Physical Therapy      2. Biceps tendinitis of right upper extremity  Follow Up In Physical Therapy          General  Reason for Referral: s/p right shoulder rotator cuff with bicep tenodesis  Referred By: Dr. Gruber  Past Medical History Relevant to Rehab: benign HTN, biceps tendinitis of right upper extremity, deltoid tendinitis of right shoulder, complete tear of right rotator cuff.  Preferred Learning Style: visual, written  General Comment: Pt wearing AB sling, tyshawn 100% of the time.    Subjective   Current Condition:   Better  Patient reports increased soreness and tyshawn brace as directed. Pt reports minimal pain, however, cont to have most difficulty sleeping.    Performing HEP?: Yes    Precautions  Precautions  UE Weight Bearing Status: Right Non-Weight Bearing  Post-Surgical Precautions: Right shoulder precautions  Precautions Comment: Dr. Gruber protocol in media/DWP: 0-2 weeks (2/19) post-op: elbow aarom, 0-6 (3/19)weeks post-op: shoulder PROM: FF as tolerated, ER to 30, IR to body.  Pain  Pain Assessment: 0-10  0-10 (Numeric) Pain Score: 3  Pain Type: Surgical pain  Pain Location: Shoulder  Pain Orientation: Right  Pain Interventions:  (PROM)  Response to Interventions: Decrease in pain    Objective     Treatments:    Therapeutic Exercise  Therapeutic Exercise Performed: Yes  Therapeutic Exercise Activity 1: PROM scapular  retraction/protraction/elevation/depression  Therapeutic Exercise Activity 2: 2x10 AAROM bicep flex  Therapeutic Exercise Activity 3: Pendulum fwd/back x15, 15 CW/CCW  Therapeutic Exercise Activity 4: 2x15 hand   Therapeutic Exercise Activity 5: PROM R shoulder flexion, ER, and IR to body  Therapeutic Exercise Activity 6: R UT stretch 3x30 sec bue       EDUCATION:   Individual(s) Educated: Patient  Education Provided: Same as previous   Handout(s) Provided: Scanned into chart  Home Program: Added Sup/Pron long axis within pain free limits   Risk and Benefits Discussed with Patient/Caregiver/Other: Yes   Patient/Caregiver Demonstrated Understanding: Yes   Patient Response to Education: Patient/Caregiver verbalized understanding of information, Patient/Caregiver performed return demonstration of exercises/activities, and Patient/Caregiver asked appropriate questions    Assessment: Pt cont to report reduced pain and tightness post manual work and PROM within protocol including body mechanics and posture cues given during bicep TE as well as cervical mobility stretches and proper  body mechanics during pendulums.  PROM in scaption with aprox ER- 20 degree, IR to body, ABD 60 degrees, Flex with elbow bent 60 degrees.     PT Assessment  PT Assessment Results: Decreased strength, Decreased range of motion, Pain, Orthopedic restrictions  Rehab Prognosis: Good    Plan: Continue with POC. Continue with core stability, UE strengthening, ROM, flexibility, and scapular stability, so the patient can perform FA's without increased pain or difficulty.    OP PT Plan  Treatment/Interventions: Education/ Instruction, Manual therapy, Neuromuscular re-education, Therapeutic exercises, Therapeutic activities  PT Plan: Skilled PT  PT Frequency: 2 times per week  Duration: 6 weeks  Onset Date: 02/05/25  Certification Period Start Date: 02/07/25  Certification Period End Date: 03/21/25  Number of Treatments Authorized: 4 of 12 (20  visits per year)  Rehab Potential: Good  Plan of Care Agreement: Patient  Payor: [employer] [facility] / Plan: [employer] / Product Type: *No Product type* /     Visit count this episode: 4 visits    Goals:  Active       PT Problem       Patient will achieve passive range of motion right shoulder flexion of at least 160 degrees (Progressing)       Start:  02/07/25    Expected End:  03/21/25            Patient will achieve passive range of motion right shoulder abduction of at least 160 degrees (Progressing)       Start:  02/07/25    Expected End:  03/21/25            Patient will achieve passive range of motion right shoulder external rotation of 30 degrees in 45 degrees abd (Progressing)       Start:  02/07/25    Expected End:  03/21/25            Patient will achieve right shoulder flexion strength of at least 4+/5       Start:  02/07/25    Expected End:  05/30/25            Patient will achieve right shoulder abduction strength of at least 4+/5       Start:  02/07/25    Expected End:  05/30/25            Patient will achieve right shoulder external rotation strength of at least 4+/5 in neutral       Start:  02/07/25    Expected End:  05/30/25            Patient will achieve right shoulder internal rotation strength of at least 4+/5 in neutral       Start:  02/07/25    Expected End:  05/30/25            Patient will achieve right elbow flexion strength of at least 4+/5       Start:  02/07/25    Expected End:  05/02/25            Patient will achieve right elbow extension strength of at least 4+/5       Start:  02/07/25    Expected End:  05/02/25            Patient will demonstrate independence in home program for support of progression (Progressing)       Start:  02/07/25    Expected End:  03/21/25         Goal Note       Patient will demonstrate independence in home program for support of progression              Patient will report pain of no more than 2/10 demonstrating a reduction of overall  pain (Progressing)       Start:  02/07/25    Expected End:  03/21/25            Patient will show a significant change in Quick Dash (93.18 to 85.18) patient reported outcome tool to demonstrate subjective imporovement       Start:  02/07/25    Expected End:  03/21/25                 Lorenza Irene, PTA

## 2025-02-17 NOTE — PROGRESS NOTES
Reason for Appointment  Chief Complaint   Patient presents with    Right Shoulder - Post-op     History of Present Illness  Patient is here today 2 weeks s/p a right shoulder arthroscopic rotator cuff repair and biceps tenodesis on 2/5/2025. Patient is doing well overall.  Wounds are healing nicely with no signs of infection, sutures were removed today.  We are going slow with large tear.  He will continue with passive shoulder motion but can progress to active elbow motion.  We went over his intraoperative pictures and he understands postoperative protocol fully.  We will follow-up with him in 4 weeks.    Assessment   Right rotator cuff tear

## 2025-02-20 ENCOUNTER — TREATMENT (OUTPATIENT)
Dept: PHYSICAL THERAPY | Facility: HOSPITAL | Age: 60
End: 2025-02-20
Payer: COMMERCIAL

## 2025-02-20 DIAGNOSIS — M75.21 BICEPS TENDINITIS OF RIGHT UPPER EXTREMITY: ICD-10-CM

## 2025-02-20 DIAGNOSIS — M75.121 COMPLETE TEAR OF RIGHT ROTATOR CUFF, UNSPECIFIED WHETHER TRAUMATIC: ICD-10-CM

## 2025-02-20 PROCEDURE — 97110 THERAPEUTIC EXERCISES: CPT | Mod: GP,CQ

## 2025-02-20 ASSESSMENT — PAIN SCALES - GENERAL: PAINLEVEL_OUTOF10: 4

## 2025-02-20 ASSESSMENT — PAIN - FUNCTIONAL ASSESSMENT: PAIN_FUNCTIONAL_ASSESSMENT: 0-10

## 2025-02-20 NOTE — PROGRESS NOTES
Physical Therapy Treatment    Patient Name: Av Francisco  MRN: [id number]  Encounter Date: 2/20/2025  Time Calculation  Start Time: 0914  Stop Time: 0954  Time Calculation (min): 40 min        PT Therapeutic Procedures Time Entry  Therapeutic Exercise Time Entry: 40      Current Problem  Problem List Items Addressed This Visit             ICD-10-CM    Biceps tendinitis of right upper extremity M75.21    Complete tear of right rotator cuff M75.121     1. Complete tear of right rotator cuff, unspecified whether traumatic  Follow Up In Physical Therapy      2. Biceps tendinitis of right upper extremity  Follow Up In Physical Therapy          General  Reason for Referral: s/p right shoulder rotator cuff with bicep tenodesis  Referred By: Dr. Gruber  Past Medical History Relevant to Rehab: benign HTN, biceps tendinitis of right upper extremity, deltoid tendinitis of right shoulder, complete tear of right rotator cuff.  Preferred Learning Style: visual, written  General Comment: Pt reports 4/10 pain in shoulder after AB pillow removed at MD follow up.    Subjective   Current Condition:   Better  Patient reports spike in pain in shoulder to 4/10 after ABD pillow being removed from sling by MD on Monday this week.  Reports PA told him to start doing AAROM shoulder motions in therapy, however, this is not documented in note, PTA reached out to MD for clarification.    Performing HEP?: Yes    Precautions  Precautions  UE Weight Bearing Status: Right Non-Weight Bearing  Post-Surgical Precautions: Right shoulder precautions  Precautions Comment: Dr. Gruber protocol in media/DWP: 0-2 weeks (2/19) post-op: elbow aarom, 0-6 (3/19)weeks post-op: shoulder PROM: FF as tolerated, ER to 30, IR to body.  Pain  Pain Assessment: 0-10  0-10 (Numeric) Pain Score: 4  Pain Type: Acute pain, Surgical pain  Pain Location: Shoulder  Pain Orientation: Right  Pain Interventions: Home medication, Therapeutic presence  Response to Interventions:  Decrease in pain    Objective       Treatments:    Therapeutic Exercise  Therapeutic Exercise Performed: Yes  Therapeutic Exercise Activity 1: PROM scapular retraction/protraction/elevation/depression  Therapeutic Exercise Activity 2: 2x10 AAROM bicep flex palm neutral, palm down  Therapeutic Exercise Activity 3: Pendulum fwd/back x15, 15 CW/CCW  Therapeutic Exercise Activity 4: 2x15 hand   Therapeutic Exercise Activity 5: PROM R shoulder flexion, ER, and IR to body  Therapeutic Exercise Activity 6: R UT stretch 3x30 sec bue  Therapeutic Exercise Activity 8: B UE levator stretch 30sec each side       EDUCATION:   Individual(s) Educated: Patient  Education Provided: see below  Handout(s) Provided: Scanned into chart  Home Program: Access Code: [email or web address]  URL: [email or web address]  Date: 02/20/2025  Prepared by: Lorenza Irene    Exercises  - Closing and Opening Hand With Shoulder Sling  - 2 x daily - 10 reps  - Forearm Pronation and Supination With Shoulder Sling  - 2 x daily - 10 reps  - Circular Shoulder Pendulum with Table Support  - 2 x daily - 2 reps - 30 seconds hold  - Seated Elbow Flexion Extension AROM  - 1 x daily - 7 x weekly - 3 sets - 10 reps  - Seated Gentle Upper Trapezius Stretch  - 1 x daily - 7 x weekly - 3 sets - 10 reps  - Gentle Levator Scapulae Stretch  - 1 x daily - 7 x weekly - 3 sets - 10 reps    Risk and Benefits Discussed with Patient/Caregiver/Other: Yes   Patient/Caregiver Demonstrated Understanding: Yes   Patient Response to Education: Patient/Caregiver verbalized understanding of information, Patient/Caregiver performed return demonstration of exercises/activities, and Patient/Caregiver asked appropriate questions    Assessment: Pt cont to tolerate PROM within pain free limits per protocol without pain.  PTA added AROM palm neutral and palm down bicep curls to HEP and gentle levator stretch to reduce tightness and improve posture in sling.      PT  Assessment  PT Assessment Results: Decreased strength, Decreased range of motion, Pain, Orthopedic restrictions  Rehab Prognosis: Good    Plan: Continue with POC. Continue with core stability, UE strengthening, ROM, flexibility, and scapular stability, so the patient can perform FA's without increased pain or difficulty.  Cont with PROM per protocol.  OP PT Plan  Treatment/Interventions: Education/ Instruction, Manual therapy, Neuromuscular re-education, Therapeutic exercises, Therapeutic activities  PT Plan: Skilled PT  PT Frequency: 2 times per week  Duration: 6 weeks  Onset Date: 02/05/25  Certification Period Start Date: 02/07/25  Certification Period End Date: 03/21/25  Number of Treatments Authorized: 5 of 12 (20 visits per year)  Rehab Potential: Good  Plan of Care Agreement: Patient  Payor: [employer] [facility] / Plan: [employer] / Product Type: *No Product type* /     Visit count this episode: 5 visits    Goals:  Active       PT Problem       Patient will achieve passive range of motion right shoulder flexion of at least 160 degrees (Progressing)       Start:  02/07/25    Expected End:  03/21/25            Patient will achieve passive range of motion right shoulder abduction of at least 160 degrees (Progressing)       Start:  02/07/25    Expected End:  03/21/25            Patient will achieve passive range of motion right shoulder external rotation of 30 degrees in 45 degrees abd (Progressing)       Start:  02/07/25    Expected End:  03/21/25            Patient will achieve right shoulder flexion strength of at least 4+/5       Start:  02/07/25    Expected End:  05/30/25            Patient will achieve right shoulder abduction strength of at least 4+/5       Start:  02/07/25    Expected End:  05/30/25            Patient will achieve right shoulder external rotation strength of at least 4+/5 in neutral       Start:  02/07/25    Expected End:  05/30/25            Patient will achieve right  shoulder internal rotation strength of at least 4+/5 in neutral       Start:  02/07/25    Expected End:  05/30/25            Patient will achieve right elbow flexion strength of at least 4+/5       Start:  02/07/25    Expected End:  05/02/25            Patient will achieve right elbow extension strength of at least 4+/5       Start:  02/07/25    Expected End:  05/02/25            Patient will demonstrate independence in home program for support of progression (Progressing)       Start:  02/07/25    Expected End:  03/21/25         Goal Note       Patient will demonstrate independence in home program for support of progression              Patient will report pain of no more than 2/10 demonstrating a reduction of overall pain (Progressing)       Start:  02/07/25    Expected End:  03/21/25            Patient will show a significant change in Quick Dash (93.18 to 85.18) patient reported outcome tool to demonstrate subjective imporovement       Start:  02/07/25    Expected End:  03/21/25                 Lorenza Irene, PTA

## 2025-02-24 ENCOUNTER — TREATMENT (OUTPATIENT)
Dept: PHYSICAL THERAPY | Facility: HOSPITAL | Age: 60
End: 2025-02-24
Payer: COMMERCIAL

## 2025-02-24 DIAGNOSIS — M75.121 COMPLETE TEAR OF RIGHT ROTATOR CUFF, UNSPECIFIED WHETHER TRAUMATIC: ICD-10-CM

## 2025-02-24 DIAGNOSIS — M75.21 BICEPS TENDINITIS OF RIGHT UPPER EXTREMITY: ICD-10-CM

## 2025-02-24 PROCEDURE — 97110 THERAPEUTIC EXERCISES: CPT | Mod: GP,CQ

## 2025-02-24 ASSESSMENT — PAIN - FUNCTIONAL ASSESSMENT: PAIN_FUNCTIONAL_ASSESSMENT: 0-10

## 2025-02-24 ASSESSMENT — PAIN SCALES - GENERAL: PAINLEVEL_OUTOF10: 0 - NO PAIN

## 2025-02-24 NOTE — PROGRESS NOTES
Physical Therapy Treatment    Patient Name: Av Francisco  MRN: [id number]  Encounter Date: 2/24/2025  Time Calculation  Start Time: 0915  Stop Time: 0954  Time Calculation (min): 39 min        PT Therapeutic Procedures Time Entry  Therapeutic Exercise Time Entry: 39        Current Problem  Problem List Items Addressed This Visit             ICD-10-CM    Biceps tendinitis of right upper extremity M75.21    Complete tear of right rotator cuff M75.121     1. Complete tear of right rotator cuff, unspecified whether traumatic  Follow Up In Physical Therapy      2. Biceps tendinitis of right upper extremity  Follow Up In Physical Therapy          General  Reason for Referral: s/p right shoulder rotator cuff with bicep tenodesis  Referred By: Dr. Gruber  Past Medical History Relevant to Rehab: benign HTN, biceps tendinitis of right upper extremity, deltoid tendinitis of right shoulder, complete tear of right rotator cuff.  Preferred Learning Style: visual, written  General Comment: Pt cont to have pain in bicep region and top of shoulder which seems to get slightly worse during palm down bicep curls.    Subjective   Current Condition:   Better  Patient reports feeling less swelling and tightness in shoulder and down into elbow and hand. Reports no pain at all today.     Performing HEP?: Yes    Precautions  Precautions  UE Weight Bearing Status: Right Non-Weight Bearing  Post-Surgical Precautions: Right shoulder precautions  Precautions Comment: Dr. Gruber protocol in media/DWP: 0-2 weeks (2/19) post-op: elbow aarom, 0-6 (3/19)weeks post-op: shoulder PROM: FF as tolerated, ER to 30, IR to body.  Pain  Pain Assessment: 0-10  0-10 (Numeric) Pain Score: 0 - No pain    Objective     Treatments:    Therapeutic Exercise  Therapeutic Exercise Performed: Yes  Therapeutic Exercise Activity 1: PROM scapular retraction/protraction/elevation/depression  Therapeutic Exercise Activity 2: 2x10 AAROM bicep flex palm neutral, palm  down  Therapeutic Exercise Activity 3: Pendulum fwd/back x15, 15 CW/CCW  Therapeutic Exercise Activity 4: 2x15 hand   Therapeutic Exercise Activity 5: PROM R shoulder flexion, ER, and IR to body  Therapeutic Exercise Activity 6: R UT stretch 3x30 sec bue  Therapeutic Exercise Activity 7: 10x scap retraction seated  Therapeutic Exercise Activity 8: B UE levator stretch 30sec each side       EDUCATION:   Individual(s) Educated: Patient  Education Provided: Added  light scap retractions   Handout(s) Provided: Scanned into chart  Home Program: 1x10   Risk and Benefits Discussed with Patient/Caregiver/Other: Yes   Patient/Caregiver Demonstrated Understanding: Yes   Patient Response to Education: Patient/Caregiver verbalized understanding of information, Patient/Caregiver performed return demonstration of exercises/activities, and Patient/Caregiver asked appropriate questions    Assessment: Cont to report increased discomfort in bicep during scaption abd and some ER with light manual STM to bicep and deltoid region to reduce pain and tightness and allow further PROM.  Cues for proper posture during pendulums and scap retractions tasks.    PT Assessment  PT Assessment Results: Decreased strength, Decreased range of motion, Pain, Orthopedic restrictions  Rehab Prognosis: Good    Plan: Continue with POC. Continue with core stability, UE strengthening, ROM, flexibility, and scapular stability, so the patient can perform FA's without increased pain or difficulty.  PROM per protocol.  OP PT Plan  Treatment/Interventions: Education/ Instruction, Manual therapy, Neuromuscular re-education, Therapeutic exercises, Therapeutic activities  PT Plan: Skilled PT  PT Frequency: 2 times per week  Duration: 6 weeks  Onset Date: 02/05/25  Certification Period Start Date: 02/07/25  Certification Period End Date: 03/21/25  Number of Treatments Authorized: 6 of 12 (20 visits per year)  Rehab Potential: Good  Plan of Care Agreement:  Patient  Payor: [employer] [facility] / Plan: [employer] / Product Type: *No Product type* /     Visit count this episode: 6 visits    Goals:  Active       PT Problem       Patient will achieve passive range of motion right shoulder flexion of at least 160 degrees (Progressing)       Start:  02/07/25    Expected End:  03/21/25            Patient will achieve passive range of motion right shoulder abduction of at least 160 degrees (Progressing)       Start:  02/07/25    Expected End:  03/21/25            Patient will achieve passive range of motion right shoulder external rotation of 30 degrees in 45 degrees abd (Progressing)       Start:  02/07/25    Expected End:  03/21/25            Patient will achieve right shoulder flexion strength of at least 4+/5       Start:  02/07/25    Expected End:  05/30/25            Patient will achieve right shoulder abduction strength of at least 4+/5       Start:  02/07/25    Expected End:  05/30/25            Patient will achieve right shoulder external rotation strength of at least 4+/5 in neutral       Start:  02/07/25    Expected End:  05/30/25            Patient will achieve right shoulder internal rotation strength of at least 4+/5 in neutral       Start:  02/07/25    Expected End:  05/30/25            Patient will achieve right elbow flexion strength of at least 4+/5       Start:  02/07/25    Expected End:  05/02/25            Patient will achieve right elbow extension strength of at least 4+/5       Start:  02/07/25    Expected End:  05/02/25            Patient will demonstrate independence in home program for support of progression (Progressing)       Start:  02/07/25    Expected End:  03/21/25         Goal Note       Patient will demonstrate independence in home program for support of progression              Patient will report pain of no more than 2/10 demonstrating a reduction of overall pain (Progressing)       Start:  02/07/25    Expected End:  03/21/25             Patient will show a significant change in Quick Dash (93.18 to 85.18) patient reported outcome tool to demonstrate subjective imporovement       Start:  02/07/25    Expected End:  03/21/25                 Lorenza Irene PTA

## 2025-02-27 ENCOUNTER — TREATMENT (OUTPATIENT)
Dept: PHYSICAL THERAPY | Facility: HOSPITAL | Age: 60
End: 2025-02-27
Payer: COMMERCIAL

## 2025-02-27 DIAGNOSIS — M75.121 COMPLETE TEAR OF RIGHT ROTATOR CUFF, UNSPECIFIED WHETHER TRAUMATIC: ICD-10-CM

## 2025-02-27 DIAGNOSIS — M75.21 BICEPS TENDINITIS OF RIGHT UPPER EXTREMITY: ICD-10-CM

## 2025-02-27 PROCEDURE — 97110 THERAPEUTIC EXERCISES: CPT | Mod: GP,CQ

## 2025-02-27 ASSESSMENT — PAIN - FUNCTIONAL ASSESSMENT: PAIN_FUNCTIONAL_ASSESSMENT: 0-10

## 2025-02-27 ASSESSMENT — PAIN SCALES - GENERAL: PAINLEVEL_OUTOF10: 2

## 2025-02-27 NOTE — PROGRESS NOTES
Physical Therapy Treatment    Patient Name: Av Francisco  MRN: [id number]  Encounter Date: 2/27/2025  Time Calculation  Start Time: 0915  Stop Time: 0957  Time Calculation (min): 42 min        PT Therapeutic Procedures Time Entry  Manual Therapy Time Entry: 4  Therapeutic Exercise Time Entry: 38        Current Problem  Problem List Items Addressed This Visit             ICD-10-CM    Biceps tendinitis of right upper extremity M75.21    Complete tear of right rotator cuff M75.121     1. Complete tear of right rotator cuff, unspecified whether traumatic  Follow Up In Physical Therapy      2. Biceps tendinitis of right upper extremity  Follow Up In Physical Therapy          General  Reason for Referral: s/p right shoulder rotator cuff with bicep tenodesis  Referred By: Dr. Gruber  Past Medical History Relevant to Rehab: benign HTN, biceps tendinitis of right upper extremity, deltoid tendinitis of right shoulder, complete tear of right rotator cuff.  Preferred Learning Style: visual, written  General Comment: Pt reports reduced pain and tightness in bicep with cont compliance with sling 100% of the time and HEP compliance.    Subjective   Current Condition:   Better  Patient reports feeling looser and less pain in bicep and shoulder region.  Cont compliance with sling and HEP.    Performing HEP?: Yes    Precautions  Precautions  UE Weight Bearing Status: Right Non-Weight Bearing  Post-Surgical Precautions: Right shoulder precautions  Precautions Comment: Dr. Gruber protocol in media/DWP: 0-2 weeks (2/19) post-op: elbow aarom, 0-6 (3/19)weeks post-op: shoulder PROM: FF as tolerated, ER to 30, IR to body.  Pain  Pain Assessment: 0-10  0-10 (Numeric) Pain Score: 2  Pain Type: Surgical pain  Pain Location: Shoulder  Pain Orientation: Right  Pain Interventions: Massage, Therapeutic touch  Response to Interventions: Decrease in pain    Objective     Treatments:    Therapeutic Exercise  Therapeutic Exercise Performed:  Yes  Therapeutic Exercise Activity 1: PROM scapular retraction/protraction/elevation/depression  Therapeutic Exercise Activity 2: 2x10 AROM bicep flex palm neutral, palm down  Therapeutic Exercise Activity 3: Pendulum fwd/back x15, 15 CW/CCW  Therapeutic Exercise Activity 4: 2x15 hand   Therapeutic Exercise Activity 5: PROM R shoulder flexion, ER, and IR to body  Therapeutic Exercise Activity 6: R UT stretch 3x30 sec bue  Therapeutic Exercise Activity 7: 10x scap retraction seated  Therapeutic Exercise Activity 8: B UE levator stretch 30sec each side         Manual Therapy  Manual Therapy Performed: Yes  Manual Therapy Activity 1: Light massage and manual scar massage to upper trap, bicep and scars as tolerated.       EDUCATION:   Individual(s) Educated: Patient  Education Provided: Same as previous per protocol.    Handout(s) Provided: Scanned into chart  Home Program: Same as previous per protocol.  Risk and Benefits Discussed with Patient/Caregiver/Other: Yes   Patient/Caregiver Demonstrated Understanding: Yes   Patient Response to Education: Patient/Caregiver verbalized understanding of information, Patient/Caregiver performed return demonstration of exercises/activities, and Patient/Caregiver asked appropriate questions    Assessment: Pt cont to report reduced pain and tightness post manual work and PROM with reduced tightness noted over scars this date.  Improved scap retraction posture and control noted as well as improved bicep ROM.    PT Assessment  PT Assessment Results: Decreased strength, Decreased range of motion, Pain, Orthopedic restrictions  Rehab Prognosis: Good    Plan: Continue with POC. Continue with core stability, UE strengthening, ROM, flexibility, and scapular stability, so the patient can perform FA's without increased pain or difficulty.  Cont with PROM per protocol.     OP PT Plan  Treatment/Interventions: Education/ Instruction, Manual therapy, Neuromuscular re-education, Therapeutic  exercises, Therapeutic activities  PT Plan: Skilled PT  PT Frequency: 2 times per week  Duration: 6 weeks  Onset Date: 02/05/25  Certification Period Start Date: 02/07/25  Certification Period End Date: 03/21/25  Number of Treatments Authorized: 7 of 12 (20 visits per year)  Rehab Potential: Good  Plan of Care Agreement: Patient  Payor: [employer] [facility] / Plan: [employer] / Product Type: *No Product type* /     Visit count this episode: 7 visits    Goals:  Active       PT Problem       Patient will achieve passive range of motion right shoulder flexion of at least 160 degrees (Progressing)       Start:  02/07/25    Expected End:  03/21/25            Patient will achieve passive range of motion right shoulder abduction of at least 160 degrees (Progressing)       Start:  02/07/25    Expected End:  03/21/25            Patient will achieve passive range of motion right shoulder external rotation of 30 degrees in 45 degrees abd (Progressing)       Start:  02/07/25    Expected End:  03/21/25            Patient will achieve right shoulder flexion strength of at least 4+/5       Start:  02/07/25    Expected End:  05/30/25            Patient will achieve right shoulder abduction strength of at least 4+/5       Start:  02/07/25    Expected End:  05/30/25            Patient will achieve right shoulder external rotation strength of at least 4+/5 in neutral       Start:  02/07/25    Expected End:  05/30/25            Patient will achieve right shoulder internal rotation strength of at least 4+/5 in neutral       Start:  02/07/25    Expected End:  05/30/25            Patient will achieve right elbow flexion strength of at least 4+/5       Start:  02/07/25    Expected End:  05/02/25            Patient will achieve right elbow extension strength of at least 4+/5       Start:  02/07/25    Expected End:  05/02/25            Patient will demonstrate independence in home program for support of progression  (Progressing)       Start:  02/07/25    Expected End:  03/21/25         Goal Note       Patient will demonstrate independence in home program for support of progression              Patient will report pain of no more than 2/10 demonstrating a reduction of overall pain (Progressing)       Start:  02/07/25    Expected End:  03/21/25            Patient will show a significant change in Quick Dash (93.18 to 85.18) patient reported outcome tool to demonstrate subjective imporovement       Start:  02/07/25    Expected End:  03/21/25                 Lorenza Irene, PTA

## 2025-03-05 ENCOUNTER — TREATMENT (OUTPATIENT)
Dept: PHYSICAL THERAPY | Facility: HOSPITAL | Age: 60
End: 2025-03-05
Payer: COMMERCIAL

## 2025-03-05 DIAGNOSIS — M75.21 BICEPS TENDINITIS OF RIGHT UPPER EXTREMITY: ICD-10-CM

## 2025-03-05 DIAGNOSIS — M75.121 COMPLETE TEAR OF RIGHT ROTATOR CUFF, UNSPECIFIED WHETHER TRAUMATIC: ICD-10-CM

## 2025-03-05 PROCEDURE — 97110 THERAPEUTIC EXERCISES: CPT | Mod: GP,CQ

## 2025-03-05 PROCEDURE — 97140 MANUAL THERAPY 1/> REGIONS: CPT | Mod: GP,CQ

## 2025-03-05 ASSESSMENT — PAIN - FUNCTIONAL ASSESSMENT: PAIN_FUNCTIONAL_ASSESSMENT: 0-10

## 2025-03-05 ASSESSMENT — PAIN SCALES - GENERAL: PAINLEVEL_OUTOF10: 2

## 2025-03-05 NOTE — PROGRESS NOTES
Physical Therapy Treatment    Patient Name: Av Francisco  MRN: [id number]  Today's Date: 3/5/2025  Time Calculation  Start Time: 0914  Stop Time: 0954  Time Calculation (min): 40 min        PT Therapeutic Procedures Time Entry  Manual Therapy Time Entry: 8  Therapeutic Exercise Time Entry: 32                Current Problem  1. Complete tear of right rotator cuff, unspecified whether traumatic  Follow Up In Physical Therapy      2. Biceps tendinitis of right upper extremity  Follow Up In Physical Therapy          General  Reason for Referral: s/p right shoulder rotator cuff with bicep tenodesis  Referred By: Dr. Gruber  Past Medical History Relevant to Rehab: benign HTN, biceps tendinitis of right upper extremity, deltoid tendinitis of right shoulder, complete tear of right rotator cuff.  Preferred Learning Style: visual, written  General Comment: Pt reports reduced pain and tightness in bicep with cont compliance with sling 100% of the time and HEP compliance.    Subjective   Current Condition:   Same  Patient reports     Performing HEP?: Yes    Precautions  Precautions  UE Weight Bearing Status: Right Non-Weight Bearing  Post-Surgical Precautions: Right shoulder precautions  Precautions Comment: Dr. Gruber protocol in media/DWP: 0-2 weeks (2/19) post-op: elbow aarom, 0-6 (3/19)weeks post-op: shoulder PROM: FF as tolerated, ER to 30, IR to body.  Pain  Pain Assessment: 0-10  0-10 (Numeric) Pain Score: 2  Pain Location: Shoulder  Pain Orientation: Right  Response to Interventions: No change in pain    Objective   Shoulder       Shoulder AROM     Shoulder PROM: R ER 30 limited per protocol         Treatments:    Therapeutic Exercise  Therapeutic Exercise Performed: Yes  Therapeutic Exercise Activity 1: PROM scapular retraction/protraction/elevation/depression  Therapeutic Exercise Activity 2: 2x10 AROM bicep flex palm neutral, palm down  Therapeutic Exercise Activity 3: Pendulum fwd/back x15, 15 CW/CCW  Therapeutic  Exercise Activity 4: 2x15 hand   Therapeutic Exercise Activity 5: PROM R shoulder flexion, ER, and IR to body  Therapeutic Exercise Activity 6: R UT stretch 3x30 sec bue  Therapeutic Exercise Activity 7: 10x scap retraction seated  Therapeutic Exercise Activity 8: B UE levator stretch 30sec each side         Manual Therapy  Manual Therapy Performed: Yes  Manual Therapy Activity 1: TPR R Levator scap and UT to decrease mm tension  Manual Therapy Activity 2: R GH joint mobs posterior glide grade I-II to decrease pain                EDUCATION:   Individual(s) Educated: Patient  Education Provided:   Risk and Benefits Discussed with Patient/Caregiver/Other: Yes   Patient/Caregiver Demonstrated Understanding: Yes   Patient Response to Education: Patient/Caregiver verbalized understanding of information    Assessment: Patient requires cuing to relax the R UE at times during PROM, as he tends to have pain when he mm guards, with good follow through demonstrated. Noted decreased mm tension in the R UT and levator scap with TPR. No change with pain level at end of session.   PT Assessment  PT Assessment Results: Decreased strength, Decreased range of motion, Pain, Orthopedic restrictions  Rehab Prognosis: Good    Plan: Continue to progress current POC as tolerated to facilitate ability to perform functional activities.   OP PT Plan  Treatment/Interventions: Education/ Instruction, Manual therapy, Neuromuscular re-education, Therapeutic exercises, Therapeutic activities  PT Plan: Skilled PT  PT Frequency: 2 times per week  Duration: 6 weeks  Onset Date: 02/05/25  Certification Period Start Date: 02/07/25  Certification Period End Date: 03/21/25  Number of Treatments Authorized: 8 of 12 (20 visits per year)  Rehab Potential: Good  Plan of Care Agreement: Patient    Goals:  Active       PT Problem       Patient will achieve passive range of motion right shoulder flexion of at least 160 degrees (Progressing)       Start:   02/07/25    Expected End:  03/21/25            Patient will achieve passive range of motion right shoulder abduction of at least 160 degrees (Progressing)       Start:  02/07/25    Expected End:  03/21/25            Patient will achieve passive range of motion right shoulder external rotation of 30 degrees in 45 degrees abd (Progressing)       Start:  02/07/25    Expected End:  03/21/25            Patient will achieve right shoulder flexion strength of at least 4+/5       Start:  02/07/25    Expected End:  05/30/25            Patient will achieve right shoulder abduction strength of at least 4+/5       Start:  02/07/25    Expected End:  05/30/25            Patient will achieve right shoulder external rotation strength of at least 4+/5 in neutral       Start:  02/07/25    Expected End:  05/30/25            Patient will achieve right shoulder internal rotation strength of at least 4+/5 in neutral       Start:  02/07/25    Expected End:  05/30/25            Patient will achieve right elbow flexion strength of at least 4+/5       Start:  02/07/25    Expected End:  05/02/25            Patient will achieve right elbow extension strength of at least 4+/5       Start:  02/07/25    Expected End:  05/02/25            Patient will demonstrate independence in home program for support of progression (Progressing)       Start:  02/07/25    Expected End:  03/21/25         Goal Note       Patient will demonstrate independence in home program for support of progression              Patient will report pain of no more than 2/10 demonstrating a reduction of overall pain (Progressing)       Start:  02/07/25    Expected End:  03/21/25            Patient will show a significant change in Quick Dash (93.18 to 85.18) patient reported outcome tool to demonstrate subjective imporovement       Start:  02/07/25    Expected End:  03/21/25                 Niels Meredith PTA

## 2025-03-10 ENCOUNTER — TREATMENT (OUTPATIENT)
Dept: PHYSICAL THERAPY | Facility: HOSPITAL | Age: 60
End: 2025-03-10
Payer: COMMERCIAL

## 2025-03-10 DIAGNOSIS — M75.21 BICEPS TENDINITIS OF RIGHT UPPER EXTREMITY: ICD-10-CM

## 2025-03-10 DIAGNOSIS — M75.121 COMPLETE TEAR OF RIGHT ROTATOR CUFF, UNSPECIFIED WHETHER TRAUMATIC: ICD-10-CM

## 2025-03-10 PROCEDURE — 97110 THERAPEUTIC EXERCISES: CPT | Mod: GP,CQ

## 2025-03-10 PROCEDURE — 97140 MANUAL THERAPY 1/> REGIONS: CPT | Mod: GP,CQ

## 2025-03-10 ASSESSMENT — PAIN SCALES - GENERAL: PAINLEVEL_OUTOF10: 0 - NO PAIN

## 2025-03-10 ASSESSMENT — PAIN - FUNCTIONAL ASSESSMENT: PAIN_FUNCTIONAL_ASSESSMENT: 0-10

## 2025-03-10 NOTE — PROGRESS NOTES
Physical Therapy Treatment    Patient Name: Av Francisco  MRN: [id number]  Encounter Date: 3/10/2025  Time Calculation  Start Time: 0915  Stop Time: 0954  Time Calculation (min): 39 min        PT Therapeutic Procedures Time Entry  Manual Therapy Time Entry: 10  Therapeutic Exercise Time Entry: 29        Current Problem  Problem List Items Addressed This Visit             ICD-10-CM    Biceps tendinitis of right upper extremity M75.21    Complete tear of right rotator cuff M75.121     1. Complete tear of right rotator cuff, unspecified whether traumatic  Follow Up In Physical Therapy      2. Biceps tendinitis of right upper extremity  Follow Up In Physical Therapy          General  Reason for Referral: s/p right shoulder rotator cuff with bicep tenodesis  Referred By: Dr. Gruber  Past Medical History Relevant to Rehab: benign HTN, biceps tendinitis of right upper extremity, deltoid tendinitis of right shoulder, complete tear of right rotator cuff.  Preferred Learning Style: visual, written  General Comment: Pt reports reduced pain and tightness in bicep with cont compliance with sling 100% of the time and HEP compliance.    Subjective   Current Condition:   Better  Patient reports feeling no pain in shoulder, however tends to feel pain and ache in elbow and forearm during some of the exercises with good compliance with HEP and sling.     Performing HEP?: Yes    Precautions  Precautions  UE Weight Bearing Status: Right Non-Weight Bearing  Post-Surgical Precautions: Right shoulder precautions  Precautions Comment: Dr. Gruber protocol in media/DWP: 0-2 weeks (2/19) post-op: elbow aarom, 0-6 (3/19)weeks post-op: shoulder PROM: FF as tolerated, ER to 30, IR to body.  Pain  Pain Assessment: 0-10  0-10 (Numeric) Pain Score: 0 - No pain    Objective       Treatments:    Therapeutic Exercise  Therapeutic Exercise Performed: Yes  Therapeutic Exercise Activity 1: PROM scapular  retraction/protraction/elevation/depression  Therapeutic Exercise Activity 2: 2x10 AROM bicep flex palm neutral, palm down  Therapeutic Exercise Activity 3: Pendulum fwd/back x15, 15 CW/CCW  Therapeutic Exercise Activity 4: 2x15 hand   Therapeutic Exercise Activity 5: PROM R shoulder flexion, ER, and IR to body  Therapeutic Exercise Activity 6: R UT stretch 3x30 sec bue  Therapeutic Exercise Activity 7: 10x scap retraction seated  Therapeutic Exercise Activity 8: B UE levator stretch 30sec each side         Manual Therapy  Manual Therapy Performed: Yes  Manual Therapy Activity 1: TPR R Levator scap and UT to decrease mm tension  Manual Therapy Activity 2: R GH joint mobs posterior glide grade I-II to decrease pain       EDUCATION:   Individual(s) Educated: Patient  Education Provided: review of HEP and education of what to do after coming out of sling next week.   Handout(s) Provided: Scanned into chart  Home Program: same as previous  Risk and Benefits Discussed with Patient/Caregiver/Other: Yes   Patient/Caregiver Demonstrated Understanding: Yes   Patient Response to Education: Patient/Caregiver verbalized understanding of information, Patient/Caregiver performed return demonstration of exercises/activities, and Patient/Caregiver asked appropriate questions    Assessment: Pt able to reduce tightness in bicep post manual work with cont PROM within pain free limits of protocol with reduce tightness in UT and mid scap region post TPR.      PT Assessment  PT Assessment Results: Decreased strength, Decreased range of motion, Pain, Orthopedic restrictions  Rehab Prognosis: Good    Plan: Continue with POC. Continue with core stability, UE strengthening, ROM, flexibility, and scapular stability, so the patient can perform FA's without increased pain or difficulty.      OP PT Plan  Treatment/Interventions: Education/ Instruction, Manual therapy, Neuromuscular re-education, Therapeutic exercises, Therapeutic  activities  PT Plan: Skilled PT  PT Frequency: 2 times per week  Duration: 6 weeks  Onset Date: 02/05/25  Certification Period Start Date: 02/07/25  Certification Period End Date: 03/21/25  Number of Treatments Authorized: 9 of 12 (20 visits per year)  Rehab Potential: Good  Plan of Care Agreement: Patient  Payor: [employer] [facility] / Plan: [employer] / Product Type: *No Product type* /     Visit count this episode: 9 visits    Goals:  Active       PT Problem       Patient will achieve passive range of motion right shoulder flexion of at least 160 degrees (Progressing)       Start:  02/07/25    Expected End:  03/21/25            Patient will achieve passive range of motion right shoulder abduction of at least 160 degrees (Progressing)       Start:  02/07/25    Expected End:  03/21/25            Patient will achieve passive range of motion right shoulder external rotation of 30 degrees in 45 degrees abd (Progressing)       Start:  02/07/25    Expected End:  03/21/25            Patient will achieve right shoulder flexion strength of at least 4+/5       Start:  02/07/25    Expected End:  05/30/25            Patient will achieve right shoulder abduction strength of at least 4+/5       Start:  02/07/25    Expected End:  05/30/25            Patient will achieve right shoulder external rotation strength of at least 4+/5 in neutral       Start:  02/07/25    Expected End:  05/30/25            Patient will achieve right shoulder internal rotation strength of at least 4+/5 in neutral       Start:  02/07/25    Expected End:  05/30/25            Patient will achieve right elbow flexion strength of at least 4+/5       Start:  02/07/25    Expected End:  05/02/25            Patient will achieve right elbow extension strength of at least 4+/5       Start:  02/07/25    Expected End:  05/02/25            Patient will demonstrate independence in home program for support of progression (Progressing)       Start:   02/07/25    Expected End:  03/21/25         Goal Note       Patient will demonstrate independence in home program for support of progression              Patient will report pain of no more than 2/10 demonstrating a reduction of overall pain (Progressing)       Start:  02/07/25    Expected End:  03/21/25            Patient will show a significant change in Quick Dash (93.18 to 85.18) patient reported outcome tool to demonstrate subjective imporovement       Start:  02/07/25    Expected End:  03/21/25                 Lorenza Irene, PTA

## 2025-03-12 ENCOUNTER — APPOINTMENT (OUTPATIENT)
Dept: PHYSICAL THERAPY | Facility: HOSPITAL | Age: 60
End: 2025-03-12
Payer: COMMERCIAL

## 2025-03-17 ENCOUNTER — APPOINTMENT (OUTPATIENT)
Dept: ORTHOPEDIC SURGERY | Facility: CLINIC | Age: 60
End: 2025-03-17
Payer: COMMERCIAL

## 2025-03-17 DIAGNOSIS — M75.101 TEAR OF RIGHT ROTATOR CUFF, UNSPECIFIED TEAR EXTENT, UNSPECIFIED WHETHER TRAUMATIC: Primary | ICD-10-CM

## 2025-03-17 PROCEDURE — 1036F TOBACCO NON-USER: CPT | Performed by: ORTHOPAEDIC SURGERY

## 2025-03-17 PROCEDURE — 99024 POSTOP FOLLOW-UP VISIT: CPT | Performed by: ORTHOPAEDIC SURGERY

## 2025-03-17 ASSESSMENT — PAIN - FUNCTIONAL ASSESSMENT: PAIN_FUNCTIONAL_ASSESSMENT: 0-10

## 2025-03-17 ASSESSMENT — PAIN SCALES - GENERAL: PAINLEVEL_OUTOF10: 2

## 2025-03-17 ASSESSMENT — PATIENT HEALTH QUESTIONNAIRE - PHQ9
1. LITTLE INTEREST OR PLEASURE IN DOING THINGS: NOT AT ALL
2. FEELING DOWN, DEPRESSED OR HOPELESS: NOT AT ALL
SUM OF ALL RESPONSES TO PHQ9 QUESTIONS 1 AND 2: 0

## 2025-03-18 NOTE — PROGRESS NOTES
Doing extremely well 6 weeks out from right rotator cuff repair and biceps tenodesis of large tear, surgery was on 2/5/2025.  Went over at length range of motion but no lifting over the next 8 weeks.  He is doing well in therapy but I cautioned him on being very careful with lifting.  Follow-up 8 weeks

## 2025-03-19 ENCOUNTER — TREATMENT (OUTPATIENT)
Dept: PHYSICAL THERAPY | Facility: HOSPITAL | Age: 60
End: 2025-03-19
Payer: COMMERCIAL

## 2025-03-19 DIAGNOSIS — M75.21 BICEPS TENDINITIS OF RIGHT UPPER EXTREMITY: ICD-10-CM

## 2025-03-19 DIAGNOSIS — M75.121 COMPLETE TEAR OF RIGHT ROTATOR CUFF, UNSPECIFIED WHETHER TRAUMATIC: ICD-10-CM

## 2025-03-19 PROCEDURE — 97110 THERAPEUTIC EXERCISES: CPT | Mod: GP,CQ

## 2025-03-19 ASSESSMENT — PAIN SCALES - GENERAL: PAINLEVEL_OUTOF10: 3

## 2025-03-19 ASSESSMENT — PAIN - FUNCTIONAL ASSESSMENT: PAIN_FUNCTIONAL_ASSESSMENT: 0-10

## 2025-03-19 NOTE — PROGRESS NOTES
Physical Therapy Treatment    Patient Name: Av Francisco  MRN: [id number]  Today's Date: 3/19/2025                            Current Problem  1. Complete tear of right rotator cuff, unspecified whether traumatic  Follow Up In Physical Therapy      2. Biceps tendinitis of right upper extremity  Follow Up In Physical Therapy          General  Reason for Referral: s/p right shoulder rotator cuff with bicep tenodesis  Referred By: Dr. Gruber  Past Medical History Relevant to Rehab: benign HTN, biceps tendinitis of right upper extremity, deltoid tendinitis of right shoulder, complete tear of right rotator cuff.  Preferred Learning Style: visual, written  General Comment: Pt reports reduced pain and tightness in bicep with cont compliance with sling 100% of the time and HEP compliance.    Subjective   Current Condition:   Better  Patient reports he has a little more soreness since removing the sling.     Performing HEP?: Yes    Precautions  Precautions  UE Weight Bearing Status: Right Non-Weight Bearing  Post-Surgical Precautions: Right shoulder precautions  Precautions Comment: Dr. Gruber protocol in media/DWP: 0-2 weeks (2/19) post-op: elbow aarom, 0-6 (3/19)weeks post-op: shoulder PROM: FF as tolerated, ER to 30, IR to body.  Pain  Pain Assessment: 0-10  0-10 (Numeric) Pain Score: 3  Pain Location: Shoulder  Pain Orientation: Right    Objective           Shoulder PROM: R ER 41 at 30 ABD, Flexion 135       Treatments:    Therapeutic Exercise  Therapeutic Exercise Performed: Yes  Therapeutic Exercise Activity 1: PROM scapular retraction/protraction/elevation/depression  Therapeutic Exercise Activity 2: 2x10 AROM bicep flex palm neutral, palm down  Therapeutic Exercise Activity 3: Pulleys scaption 2'  Therapeutic Exercise Activity 4: Ball squeeze with Iso Biceps x20  Therapeutic Exercise Activity 5: PROM R shoulder flexion, ER, and IR to body  Therapeutic Exercise Activity 6: AAROM ABD x15  Therapeutic Exercise  Activity 7: 10x scap retraction seated  Therapeutic Exercise Activity 8: AAROM ER x15  Therapeutic Exercise Activity 9: Supine salutes x10  Therapeutic Exercise Activity 10: AAROM Flexion x15         Manual Therapy  Manual Therapy Performed: Yes  Manual Therapy Activity 2: R GH joint mobs posterior and inferior glides grade II-III to decrease pain/increase mobility                  EDUCATION:   Individual(s) Educated: Patient  Education Provided: AAROM and AROM salutes  Risk and Benefits Discussed with Patient/Caregiver/Other: Yes   Patient/Caregiver Demonstrated Understanding: Yes   Patient Response to Education: Patient/Caregiver verbalized understanding of information and Patient/Caregiver performed return demonstration of exercises/activities    Assessment: Patient was able to perform AAROM and AROM exercises initiated today with good technique and no c/o pain.   PT Assessment  PT Assessment Results: Decreased strength, Decreased range of motion, Pain, Orthopedic restrictions  Rehab Prognosis: Good    Plan: Continue to progress current POC as tolerated to facilitate ability to perform functional activities.   OP PT Plan  Treatment/Interventions: Education/ Instruction, Manual therapy, Neuromuscular re-education, Therapeutic exercises, Therapeutic activities  PT Plan: Skilled PT  PT Frequency: 2 times per week  Duration: 6 weeks  Onset Date: 02/05/25  Certification Period Start Date: 02/07/25  Certification Period End Date: 03/21/25  Number of Treatments Authorized: 10 of 12 (20 visits per year)  Rehab Potential: Good  Plan of Care Agreement: Patient    Goals:  Active       PT Problem       Patient will achieve passive range of motion right shoulder flexion of at least 160 degrees (Progressing)       Start:  02/07/25    Expected End:  03/21/25            Patient will achieve passive range of motion right shoulder abduction of at least 160 degrees (Progressing)       Start:  02/07/25    Expected End:  03/21/25             Patient will achieve passive range of motion right shoulder external rotation of 30 degrees in 45 degrees abd (Progressing)       Start:  02/07/25    Expected End:  03/21/25            Patient will achieve right shoulder flexion strength of at least 4+/5       Start:  02/07/25    Expected End:  05/30/25            Patient will achieve right shoulder abduction strength of at least 4+/5       Start:  02/07/25    Expected End:  05/30/25            Patient will achieve right shoulder external rotation strength of at least 4+/5 in neutral       Start:  02/07/25    Expected End:  05/30/25            Patient will achieve right shoulder internal rotation strength of at least 4+/5 in neutral       Start:  02/07/25    Expected End:  05/30/25            Patient will achieve right elbow flexion strength of at least 4+/5       Start:  02/07/25    Expected End:  05/02/25            Patient will achieve right elbow extension strength of at least 4+/5       Start:  02/07/25    Expected End:  05/02/25            Patient will demonstrate independence in home program for support of progression (Progressing)       Start:  02/07/25    Expected End:  03/21/25         Goal Note       Patient will demonstrate independence in home program for support of progression              Patient will report pain of no more than 2/10 demonstrating a reduction of overall pain (Progressing)       Start:  02/07/25    Expected End:  03/21/25            Patient will show a significant change in Quick Dash (93.18 to 85.18) patient reported outcome tool to demonstrate subjective imporovement       Start:  02/07/25    Expected End:  03/21/25                 Niels Meredith, PTA   normal for race

## 2025-03-26 ENCOUNTER — TREATMENT (OUTPATIENT)
Dept: PHYSICAL THERAPY | Facility: HOSPITAL | Age: 60
End: 2025-03-26
Payer: COMMERCIAL

## 2025-03-26 DIAGNOSIS — M75.121 COMPLETE TEAR OF RIGHT ROTATOR CUFF, UNSPECIFIED WHETHER TRAUMATIC: Primary | ICD-10-CM

## 2025-03-26 DIAGNOSIS — M75.21 BICEPS TENDINITIS OF RIGHT UPPER EXTREMITY: ICD-10-CM

## 2025-03-26 PROCEDURE — 97110 THERAPEUTIC EXERCISES: CPT | Mod: GP | Performed by: PHYSICAL THERAPIST

## 2025-03-26 ASSESSMENT — PAIN - FUNCTIONAL ASSESSMENT: PAIN_FUNCTIONAL_ASSESSMENT: 0-10

## 2025-03-26 ASSESSMENT — PAIN SCALES - GENERAL: PAINLEVEL_OUTOF10: 2

## 2025-03-26 NOTE — PROGRESS NOTES
Physical Therapy Reassessment and Treatment    Patient Name: Av Francisco  MRN: [id number]  Today's Date: 3/26/2025  Time Calculation  Start Time: 0915  Stop Time: 1000  Time Calculation (min): 45 min        PT Therapeutic Procedures Time Entry  Therapeutic Exercise Time Entry: 45                Insurance:  Visit Limit: 20/yr  Co-Pay: $0    Current Problem  1. Complete tear of right rotator cuff, unspecified whether traumatic  Follow Up In Physical Therapy      2. Biceps tendinitis of right upper extremity  Follow Up In Physical Therapy        Problem List Items Addressed This Visit             ICD-10-CM    Biceps tendinitis of right upper extremity M75.21    Complete tear of right rotator cuff - Primary M75.121       General  Reason for Referral: s/p right shoulder rotator cuff with bicep tenodesis  Referred By: Dr. Gruber  Past Medical History Relevant to Rehab: benign HTN, biceps tendinitis of right upper extremity, deltoid tendinitis of right shoulder, complete tear of right rotator cuff.  Preferred Learning Style: visual, written    Subjective   Current Condition:   Better  Patient reports     Performing HEP?: Yes    Precautions  Precautions  Medical Precautions: No known precautions/limitation  Post-Surgical Precautions: Right shoulder precautions  Precautions Comment: Dr. Gruber protocol in media/DWP: 0-2 weeks (2/19) post-op: elbow aarom, 0-6 (3/19)weeks post-op: shoulder PROM: FF as tolerated, ER to 30, IR to body. shoulder AROM 6-12 weeks (3/19-4/30), Shoulder Strengthening @12 weeks post-op (4/30)  Pain  Pain Assessment: 0-10  0-10 (Numeric) Pain Score: 2  Pain Location: Shoulder  Pain Orientation: Right    Objective   Shoulder    Functional Rating Scale  Quick Dash: 18.18    Shoulder AROM  R Shoulder flexion: (180°): 148 (aarom)  L Shoulder flexion: (180°): WFL  R shoulder abduction: (180°): 103 (standing aarom)  L Shoulder abduction: (180°): WFL  R Shoulder ER: (90°): 60 @45 degrees abd (aarom)  L  shoulder ER: (90°): WFL  L shoulder IR: (70°): WFL    Shoulder Strength  R shoulder flexion: (5/5): <3/5  L shoulder flexion: (5/5): 5/5  R shoulder abduction: (5/5): <3/5  L shoulder abduction: (5/5): 5/5  R shoulder ER: (5/5): <3/5  L shoulder ER: (5/5): 5/5  R shoulder IR: (5/5) : <3/5  L shoulder IR: (5/5): 5/5    Elbow AROM  Elbow AROM WFL: yes    Elbow Strength  R elbow flexion: (5/5): at least 3+/5  L elbow flexion: (5/5): 5/5  R elbow extension: (5/5): at least 3+/5  L elbow extension: (5/5): 5/5    Outcome Measures:  Other Measures  Disability of Arm Shoulder Hand (DASH): 18.18    Treatments:  Therapeutic Exercise  Therapeutic Exercise Activity 1: PROM scapular retraction/protraction/elevation/depression  Therapeutic Exercise Activity 3: Pulleys scaption 5'  Therapeutic Exercise Activity 4: Ball squeeze with Iso Biceps x20  Therapeutic Exercise Activity 6: AAROM ABD x15  Therapeutic Exercise Activity 7: 15x scap retraction seated  Therapeutic Exercise Activity 8: AAROM ER x15  Therapeutic Exercise Activity 9: Supine salutes x10  Therapeutic Exercise Activity 10: AAROM Flexion x15         EDUCATION:   Individual(s) Educated: Patient  Education Provided: yes  Handout(s) Provided: Scanned into chart  Home Program: reviewed home exercise program, added supine shoulder flexion active range of motion   Risk and Benefits Discussed with Patient/Caregiver/Other: Yes   Patient/Caregiver Demonstrated Understanding: Yes   Patient Response to Education: Patient/Caregiver verbalized understanding of information, Patient/Caregiver performed return demonstration of exercises/activities, and Patient/Caregiver asked appropriate questions    Assessment: Patient demonstrated improved shoulder range of motion in all planes. Strength was not formally assessed due to shoulder protocol. We will continue 1x/wk for now and decrease the frequency to 1x every other week to save visits for the strengthening phase of recovery. Patient  agrees with plan.  PT Assessment  PT Assessment Results: Decreased strength, Decreased range of motion, Pain, Orthopedic restrictions  Rehab Prognosis: Good  Evaluation/Treatment Tolerance: Patient tolerated treatment well    Plan: Continue with POC. Progress exercises as tolerated.  OP PT Plan  Treatment/Interventions: Education/ Instruction, Manual therapy, Neuromuscular re-education, Therapeutic exercises, Therapeutic activities  PT Plan: Skilled PT  PT Frequency: 1 time per week  Duration: 9 weeks  Onset Date: 02/05/25  Certification Period Start Date: 02/07/25  Certification Period End Date: 05/28/25  Number of Treatments Authorized: 11 of 20  Rehab Potential: Good  Plan of Care Agreement: Patient    Goals:  Active       PT Problem       Patient will achieve passive range of motion right shoulder flexion of at least 160 degrees (Progressing)       Start:  02/07/25    Expected End:  05/28/25            Patient will achieve passive range of motion right shoulder abduction of at least 160 degrees (Progressing)       Start:  02/07/25    Expected End:  05/28/25            Patient will achieve passive range of motion right shoulder external rotation of 30 degrees in 45 degrees abd (Met)       Start:  02/07/25    Expected End:  03/21/25    Resolved:  03/26/25         Patient will achieve right shoulder flexion strength of at least 4+/5 (Progressing)       Start:  02/07/25    Expected End:  05/28/25            Patient will achieve right shoulder abduction strength of at least 4+/5 (Progressing)       Start:  02/07/25    Expected End:  05/28/25            Patient will achieve right shoulder external rotation strength of at least 4+/5 in neutral (Progressing)       Start:  02/07/25    Expected End:  05/28/25            Patient will achieve right shoulder internal rotation strength of at least 4+/5 in neutral (Progressing)       Start:  02/07/25    Expected End:  05/28/25            Patient will achieve right elbow  flexion strength of at least 4+/5 (Progressing)       Start:  02/07/25    Expected End:  05/28/25            Patient will achieve right elbow extension strength of at least 4+/5 (Progressing)       Start:  02/07/25    Expected End:  05/28/25            Patient will demonstrate independence in home program for support of progression (Met)       Start:  02/07/25    Expected End:  03/21/25    Resolved:  03/26/25         Patient will report pain of no more than 2/10 demonstrating a reduction of overall pain (Progressing)       Start:  02/07/25    Expected End:  05/28/25            Patient will show a significant change in Quick Dash (93.18 to 85.18) patient reported outcome tool to demonstrate subjective imporovement (Met)       Start:  02/07/25    Expected End:  03/21/25    Resolved:  03/26/25              Seth Huntley, PT

## 2025-04-01 ENCOUNTER — TREATMENT (OUTPATIENT)
Dept: PHYSICAL THERAPY | Facility: HOSPITAL | Age: 60
End: 2025-04-01
Payer: COMMERCIAL

## 2025-04-01 DIAGNOSIS — M75.21 BICEPS TENDINITIS OF RIGHT UPPER EXTREMITY: ICD-10-CM

## 2025-04-01 DIAGNOSIS — M75.121 COMPLETE TEAR OF RIGHT ROTATOR CUFF, UNSPECIFIED WHETHER TRAUMATIC: Primary | ICD-10-CM

## 2025-04-01 PROCEDURE — 97110 THERAPEUTIC EXERCISES: CPT | Mod: GP | Performed by: PHYSICAL THERAPIST

## 2025-04-01 ASSESSMENT — PAIN SCALES - GENERAL: PAINLEVEL_OUTOF10: 2

## 2025-04-01 ASSESSMENT — PAIN - FUNCTIONAL ASSESSMENT: PAIN_FUNCTIONAL_ASSESSMENT: 0-10

## 2025-04-01 NOTE — PROGRESS NOTES
"  Physical Therapy Treatment    Patient Name: Av Francisco  MRN: [id number]  Today's Date: 4/1/2025  Time Calculation  Start Time: 0917  Stop Time: 1000  Time Calculation (min): 43 min        PT Therapeutic Procedures Time Entry  Manual Therapy Time Entry: 5  Therapeutic Exercise Time Entry: 38                Insurance:  Visit Limit: 20/yr  Co-Pay: $0    Current Problem  1. Complete tear of right rotator cuff, unspecified whether traumatic  Follow Up In Physical Therapy      2. Biceps tendinitis of right upper extremity  Follow Up In Physical Therapy        Problem List Items Addressed This Visit             ICD-10-CM    Biceps tendinitis of right upper extremity M75.21    Complete tear of right rotator cuff - Primary M75.121       General  Reason for Referral: s/p right shoulder rotator cuff with bicep tenodesis  Referred By: Dr. Gruber  Past Medical History Relevant to Rehab: benign HTN, biceps tendinitis of right upper extremity, deltoid tendinitis of right shoulder, complete tear of right rotator cuff.  Preferred Learning Style: visual, written    Subjective   Current Condition:   Better  Patient reports feeling like his shoulder is improving.    Performing HEP?: Yes    Precautions  Precautions  Medical Precautions: No known precautions/limitation  Post-Surgical Precautions: Right shoulder precautions  Precautions Comment: Dr. Gruber protocol in media/DWP: 0-2 weeks (2/19) post-op: elbow aarom, 0-6 (3/19)weeks post-op: shoulder PROM: FF as tolerated, ER to 30, IR to body. shoulder AROM 6-12 weeks (3/19-4/30), Shoulder Strengthening @12 weeks post-op (4/30). \"no lifting over the next 8 weeks (until 5/12) per Dr. Gruber's note on 3/17.  Pain  Pain Assessment: 0-10  0-10 (Numeric) Pain Score: 2 (up to 4/10)  Pain Location: Shoulder  Pain Orientation: Right    Objective       Treatments:  Therapeutic Exercise  Therapeutic Exercise Performed: Yes  Therapeutic Exercise Activity 1: 2x10 AROM bicep flex palm neutral, " palm down  Therapeutic Exercise Activity 2: supine shoulder flexion arom x10  Therapeutic Exercise Activity 3: Pulleys scaption x10  Therapeutic Exercise Activity 5: s/l shoulder abd x5  Therapeutic Exercise Activity 6: shoulder abd iso x10  Therapeutic Exercise Activity 7: 20x scap retraction seated  Therapeutic Exercise Activity 8: s/l shoulder ER x10  Therapeutic Exercise Activity 9: Sitting salutes x20  Therapeutic Exercise Activity 10: SLA shoulder flex with thumb squeeze x10  Therapeutic Exercise Activity 11: Sci Fit UBE lvl1 2' fwd/bwd  Therapeutic Exercise Activity 12: supine scap protraction with cane x10  Therapeutic Exercise Activity 13: step and reach @ wall x10    Manual Therapy  Manual Therapy Performed: Yes  Manual Therapy Activity 2: R GH joint mobs posterior and inferior glides grade II-III to decrease pain/increase mobility    EDUCATION:   Individual(s) Educated: Patient  Education Provided: yes  Handout(s) Provided: Scanned into chart  Home Program: Access Code: [email or web address]  URL: [email or web address]  Date: 04/01/2025  Prepared by: Seth Huntley    Exercises  - Sidelying Shoulder External Rotation  - 1-2 x daily - 10 reps  - Sidelying Shoulder Abduction Palm Forward  - 1-2 x daily - 10 reps  - Shoulder Flexion Wall Slide with Towel  - 1-2 x daily - 10 reps  - Scapula Isolations  - 1-2 x daily - 10 reps  - Supine Shoulder Flexion Extension Full Range AROM  - 1-2 x daily - 10 reps    Risk and Benefits Discussed with Patient/Caregiver/Other: Yes   Patient/Caregiver Demonstrated Understanding: Yes   Patient Response to Education: Patient/Caregiver verbalized understanding of information, Patient/Caregiver performed return demonstration of exercises/activities, and Patient/Caregiver asked appropriate questions    Assessment: Patient demonstrated good tolerance to progression from active assisted range of motion to active range of motion today without complaints of increased shoulder  pain. We will continue to see the patient 1x/wk unless he is making good progress without set backs we may decrease him to once every other week in order to save visits. Patient agrees with plan.    PT Assessment  PT Assessment Results: Decreased strength, Decreased range of motion, Pain, Orthopedic restrictions  Rehab Prognosis: Good  Evaluation/Treatment Tolerance: Patient tolerated treatment well    Plan: Continue with POC. Progress exercises as tolerated.  OP PT Plan  Treatment/Interventions: Education/ Instruction, Manual therapy, Neuromuscular re-education, Therapeutic exercises, Therapeutic activities  PT Plan: Skilled PT  PT Frequency: 1 time per week  Duration: 9 weeks  Onset Date: 02/05/25  Certification Period Start Date: 02/07/25  Certification Period End Date: 05/28/25  Number of Treatments Authorized: 12 of 20  Rehab Potential: Good  Plan of Care Agreement: Patient    Goals:  Active       PT Problem       Patient will achieve passive range of motion right shoulder flexion of at least 160 degrees (Progressing)       Start:  02/07/25    Expected End:  05/28/25            Patient will achieve passive range of motion right shoulder abduction of at least 160 degrees (Progressing)       Start:  02/07/25    Expected End:  05/28/25            Patient will achieve passive range of motion right shoulder external rotation of 30 degrees in 45 degrees abd (Met)       Start:  02/07/25    Expected End:  03/21/25    Resolved:  03/26/25         Patient will achieve right shoulder flexion strength of at least 4+/5 (Progressing)       Start:  02/07/25    Expected End:  05/28/25            Patient will achieve right shoulder abduction strength of at least 4+/5 (Progressing)       Start:  02/07/25    Expected End:  05/28/25            Patient will achieve right shoulder external rotation strength of at least 4+/5 in neutral (Progressing)       Start:  02/07/25    Expected End:  05/28/25            Patient will achieve  right shoulder internal rotation strength of at least 4+/5 in neutral (Progressing)       Start:  02/07/25    Expected End:  05/28/25            Patient will achieve right elbow flexion strength of at least 4+/5 (Progressing)       Start:  02/07/25    Expected End:  05/28/25            Patient will achieve right elbow extension strength of at least 4+/5 (Progressing)       Start:  02/07/25    Expected End:  05/28/25            Patient will demonstrate independence in home program for support of progression (Met)       Start:  02/07/25    Expected End:  03/21/25    Resolved:  03/26/25         Patient will report pain of no more than 2/10 demonstrating a reduction of overall pain (Progressing)       Start:  02/07/25    Expected End:  05/28/25            Patient will show a significant change in Quick Dash (93.18 to 85.18) patient reported outcome tool to demonstrate subjective imporovement (Met)       Start:  02/07/25    Expected End:  03/21/25    Resolved:  03/26/25              Seth Huntley, PT

## 2025-04-08 ENCOUNTER — APPOINTMENT (OUTPATIENT)
Dept: PHYSICAL THERAPY | Facility: HOSPITAL | Age: 60
End: 2025-04-08
Payer: COMMERCIAL

## 2025-04-15 ENCOUNTER — TREATMENT (OUTPATIENT)
Dept: PHYSICAL THERAPY | Facility: HOSPITAL | Age: 60
End: 2025-04-15
Payer: COMMERCIAL

## 2025-04-15 DIAGNOSIS — M75.121 COMPLETE TEAR OF RIGHT ROTATOR CUFF, UNSPECIFIED WHETHER TRAUMATIC: ICD-10-CM

## 2025-04-15 DIAGNOSIS — M75.21 BICEPS TENDINITIS OF RIGHT UPPER EXTREMITY: ICD-10-CM

## 2025-04-15 PROCEDURE — 97110 THERAPEUTIC EXERCISES: CPT | Mod: GP,CQ

## 2025-04-15 ASSESSMENT — PAIN SCALES - GENERAL: PAINLEVEL_OUTOF10: 2

## 2025-04-15 ASSESSMENT — PAIN - FUNCTIONAL ASSESSMENT: PAIN_FUNCTIONAL_ASSESSMENT: 0-10

## 2025-04-15 NOTE — PROGRESS NOTES
"  Physical Therapy Treatment    Patient Name: Av Francisco  MRN: [id number]  Today's Date: 4/15/2025  Time Calculation  Start Time: 0916  Stop Time: 0956  Time Calculation (min): 40 min        PT Therapeutic Procedures Time Entry  Manual Therapy Time Entry: 4  Therapeutic Exercise Time Entry: 36                Current Problem  1. Complete tear of right rotator cuff, unspecified whether traumatic  Follow Up In Physical Therapy      2. Biceps tendinitis of right upper extremity  Follow Up In Physical Therapy          General  Reason for Referral: s/p right shoulder rotator cuff with bicep tenodesis  Referred By: Dr. Gruber  Past Medical History Relevant to Rehab: benign HTN, biceps tendinitis of right upper extremity, deltoid tendinitis of right shoulder, complete tear of right rotator cuff.  Preferred Learning Style: visual, written    Subjective   Current Condition:   Better  Patient reports the AROM exercises are getting much easier, less discomfort initiating the movement and end range during eccentric phase.     Performing HEP?: Yes    Precautions  Precautions  Medical Precautions: No known precautions/limitation  Post-Surgical Precautions: Right shoulder precautions  Precautions Comment: Dr. Gruber protocol in media/DWP: 0-2 weeks (2/19) post-op: elbow aarom, 0-6 (3/19)weeks post-op: shoulder PROM: FF as tolerated, ER to 30, IR to body. shoulder AROM 6-12 weeks (3/19-4/30), Shoulder Strengthening @12 weeks post-op (4/30). \"no lifting over the next 8 weeks (until 5/12) per Dr. Gruber's note on 3/17.  Pain  Pain Assessment: 0-10  0-10 (Numeric) Pain Score: 2  Pain Type: Surgical pain  Pain Location: Shoulder  Pain Orientation: Right    Objective        Shoulder AROM: L ER 63 at 45 ABD       Treatments:    Therapeutic Exercise  Therapeutic Exercise Performed: Yes  Therapeutic Exercise Activity 1: 2x10 AROM bicep flex palm neutral, palm down  Therapeutic Exercise Activity 2: supine shoulder flexion AROM " "2x10  Therapeutic Exercise Activity 3: Pulleys scaption 2'  Therapeutic Exercise Activity 4: AAROM ABD x15  Therapeutic Exercise Activity 5: s/l shoulder abd x5  Therapeutic Exercise Activity 6: AAROM ER x15 5\"  Therapeutic Exercise Activity 7: 20x scap retraction seated  Therapeutic Exercise Activity 8: s/l shoulder ER 2x10  Therapeutic Exercise Activity 9: Sitting salutes x20  Therapeutic Exercise Activity 11: Prone Rows 2x10  Therapeutic Exercise Activity 12: supine scap protraction 2 x10  Therapeutic Exercise Activity 13: wall slides 2x10         Manual Therapy  Manual Therapy Performed: Yes  Manual Therapy Activity 2: R GH joint mobs posterior and inferior glides grade II-III to decrease pain/increase mobility                 EDUCATION:   Individual(s) Educated: Patient  Education Provided: seated Flexion to 90  Risk and Benefits Discussed with Patient/Caregiver/Other: Yes   Patient/Caregiver Demonstrated Understanding: Yes   Patient Response to Education: Patient/Caregiver verbalized understanding of information    Assessment: Patient demonstrated improvement with ROM as noted with measurement taken today. He was able to perform seated AROM flexion without shrugging of the shoulder and no pain.   PT Assessment  PT Assessment Results: Decreased strength, Decreased range of motion, Pain, Orthopedic restrictions  Rehab Prognosis: Good    Plan: Continue to progress current POC as tolerated to facilitate ability to perform functional activities.   OP PT Plan  Treatment/Interventions: Education/ Instruction, Manual therapy, Neuromuscular re-education, Therapeutic exercises, Therapeutic activities  PT Plan: Skilled PT  PT Frequency: 1 time per week  Duration: 9 weeks  Onset Date: 02/05/25  Certification Period Start Date: 02/07/25  Certification Period End Date: 05/28/25  Number of Treatments Authorized: 13 of 20  Rehab Potential: Good  Plan of Care Agreement: Patient    Goals:  Active       PT Problem       Patient " will achieve passive range of motion right shoulder flexion of at least 160 degrees (Progressing)       Start:  02/07/25    Expected End:  05/28/25            Patient will achieve passive range of motion right shoulder abduction of at least 160 degrees (Progressing)       Start:  02/07/25    Expected End:  05/28/25            Patient will achieve passive range of motion right shoulder external rotation of 30 degrees in 45 degrees abd (Met)       Start:  02/07/25    Expected End:  03/21/25    Resolved:  03/26/25         Patient will achieve right shoulder flexion strength of at least 4+/5 (Progressing)       Start:  02/07/25    Expected End:  05/28/25            Patient will achieve right shoulder abduction strength of at least 4+/5 (Progressing)       Start:  02/07/25    Expected End:  05/28/25            Patient will achieve right shoulder external rotation strength of at least 4+/5 in neutral (Progressing)       Start:  02/07/25    Expected End:  05/28/25            Patient will achieve right shoulder internal rotation strength of at least 4+/5 in neutral (Progressing)       Start:  02/07/25    Expected End:  05/28/25            Patient will achieve right elbow flexion strength of at least 4+/5 (Progressing)       Start:  02/07/25    Expected End:  05/28/25            Patient will achieve right elbow extension strength of at least 4+/5 (Progressing)       Start:  02/07/25    Expected End:  05/28/25            Patient will demonstrate independence in home program for support of progression (Met)       Start:  02/07/25    Expected End:  03/21/25    Resolved:  03/26/25         Patient will report pain of no more than 2/10 demonstrating a reduction of overall pain (Progressing)       Start:  02/07/25    Expected End:  05/28/25            Patient will show a significant change in Quick Dash (93.18 to 85.18) patient reported outcome tool to demonstrate subjective imporovement (Met)       Start:  02/07/25    Expected  End:  03/21/25    Resolved:  03/26/25              Niels Meredith, PTA

## 2025-04-22 ENCOUNTER — APPOINTMENT (OUTPATIENT)
Dept: PHYSICAL THERAPY | Facility: HOSPITAL | Age: 60
End: 2025-04-22
Payer: COMMERCIAL

## 2025-04-29 ENCOUNTER — APPOINTMENT (OUTPATIENT)
Dept: PHYSICAL THERAPY | Facility: HOSPITAL | Age: 60
End: 2025-04-29
Payer: COMMERCIAL

## 2025-04-29 DIAGNOSIS — M75.121 COMPLETE TEAR OF RIGHT ROTATOR CUFF, UNSPECIFIED WHETHER TRAUMATIC: Primary | ICD-10-CM

## 2025-04-29 DIAGNOSIS — M75.21 BICEPS TENDINITIS OF RIGHT UPPER EXTREMITY: ICD-10-CM

## 2025-04-29 PROCEDURE — 97110 THERAPEUTIC EXERCISES: CPT | Mod: GP | Performed by: PHYSICAL THERAPIST

## 2025-04-29 ASSESSMENT — PAIN - FUNCTIONAL ASSESSMENT: PAIN_FUNCTIONAL_ASSESSMENT: 0-10

## 2025-04-29 ASSESSMENT — PAIN SCALES - GENERAL: PAINLEVEL_OUTOF10: 2

## 2025-04-29 NOTE — PROGRESS NOTES
"  Physical Therapy Reassessment and Treatment    Patient Name: Av Francisco  MRN: [id number]  Today's Date: 4/29/2025  Time Calculation  Start Time: 0918  Stop Time: 0958  Time Calculation (min): 40 min        PT Therapeutic Procedures Time Entry  Therapeutic Exercise Time Entry: 40                Insurance:  Visit Limit: 20  Co-Pay: $0    Current Problem  1. Complete tear of right rotator cuff, unspecified whether traumatic  Follow Up In Physical Therapy    Follow Up In Physical Therapy      2. Biceps tendinitis of right upper extremity  Follow Up In Physical Therapy    Follow Up In Physical Therapy        Problem List Items Addressed This Visit           ICD-10-CM    Biceps tendinitis of right upper extremity M75.21    Relevant Orders    Follow Up In Physical Therapy    Complete tear of right rotator cuff - Primary M75.121    Relevant Orders    Follow Up In Physical Therapy       General  Reason for Referral: s/p right shoulder rotator cuff with bicep tenodesis  Referred By: Dr. Gruber  Past Medical History Relevant to Rehab: benign HTN, biceps tendinitis of right upper extremity, deltoid tendinitis of right shoulder, complete tear of right rotator cuff.  Preferred Learning Style: visual, written    Subjective   Current Condition:   Better  Patient reports that his shoulder was sore over a long weekend in [place].     Performing HEP?: Yes    Precautions  Precautions  Medical Precautions: No known precautions/limitation  Post-Surgical Precautions: Right shoulder precautions  Precautions Comment: Dr. Gruber protocol in media/DWP: 0-2 weeks (2/19) post-op: elbow aarom, 0-6 (3/19)weeks post-op: shoulder PROM: FF as tolerated, ER to 30, IR to body. shoulder AROM 6-12 weeks (3/19-4/30), Shoulder Strengthening @12 weeks post-op (4/30). \"no lifting over the next 8 weeks (until 5/12) per Dr. Gruber's note on 3/17.  Pain  Pain Assessment: 0-10  0-10 (Numeric) Pain Score: 2  Pain Location: Shoulder  Pain Orientation: " Right    Objective   Shoulder    Functional Rating Scale  Quick Dash: 34.09    Shoulder AROM  R Shoulder flexion: (180°): 153 sitting (162 supine)  R shoulder abduction: (180°): 135 sitting (163 supine)  R Shoulder ER: (90°): 69 @45 degrees  R shoulder IR: (70°): WFL    Shoulder Strength  R shoulder flexion: (5/5): at least 3/5  R shoulder abduction: (5/5): at least 3/5  R shoulder ER: (5/5): at least 3/5  R shoulder IR: (5/5) : at least 3/5      Outcome Measures:  Other Measures  Disability of Arm Shoulder Hand (DASH): 34.09    Treatments:  Therapeutic Exercise  Therapeutic Exercise Performed: Yes  Therapeutic Exercise Activity 5: s/l shoulder abd x15  Therapeutic Exercise Activity 7: 20x scap retraction seated  Therapeutic Exercise Activity 8: s/l shoulder ER x15  Therapeutic Exercise Activity 9: Sitting salutes x20  Therapeutic Exercise Activity 11: Prone Rows 2x10  Therapeutic Exercise Activity 12: supine scap protraction 2 x10  Therapeutic Exercise Activity 13: wall slides 2x10  Therapeutic Exercise Activity 14: Sci Fit UBE lvl1 2' fwd/bwd  Therapeutic Exercise Activity 15: prone shoulder ext x20         EDUCATION:   Individual(s) Educated: Patient  Education Provided: yes  Handout(s) Provided: Scanned into chart  Home Program: reviewed home exercise program, reminded of no lifting until 5/12  Access Code: [email or web address]  URL: [email or web address]  Date: 04/29/2025  Prepared by: Seth Huntley    Exercises  - Prone Shoulder Row  - 1 x daily - 10-20 reps  - Prone Shoulder Extension - Single Arm  - 1 x daily - 10-20 reps    Risk and Benefits Discussed with Patient/Caregiver/Other: Yes   Patient/Caregiver Demonstrated Understanding: Yes   Patient Response to Education: Patient/Caregiver verbalized understanding of information, Patient/Caregiver performed return demonstration of exercises/activities, and Patient/Caregiver asked appropriate questions    Assessment: Patient is progressing well towards  his physical therapy goals. Patient has an appointment scheduled with his orthopedic surgeon in 2 weeks and is hoping to be able to start strengthening exercises at that time. Patient will be followed up after his doctor's appointment with anticipation of starting strengthening exercises as that time. Skilled physical therapy is warranted to address the above stated impairments, so the patient can perform functional activities and work duties without increased pain or difficulty.   PT Assessment  PT Assessment Results: Decreased strength, Decreased range of motion, Pain, Orthopedic restrictions  Rehab Prognosis: Good  Evaluation/Treatment Tolerance: Patient tolerated treatment well    Plan: Continue with POC. Progress exercises as tolerated.    OP PT Plan  Treatment/Interventions: Education/ Instruction, Manual therapy, Neuromuscular re-education, Therapeutic exercises, Therapeutic activities  PT Plan: Skilled PT  PT Frequency: 1 time per week  Duration: 9 weeks  Onset Date: 02/05/25  Certification Period Start Date: 02/07/25  Certification Period End Date: 05/28/25  Number of Treatments Authorized: 14 of 20  Rehab Potential: Good  Plan of Care Agreement: Patient    Goals:  Active       PT Problem       Patient will achieve passive range of motion right shoulder flexion of at least 160 degrees (Met)       Start:  02/07/25    Expected End:  05/28/25    Resolved:  04/29/25         Patient will achieve passive range of motion right shoulder abduction of at least 160 degrees (Met)       Start:  02/07/25    Expected End:  05/28/25    Resolved:  04/29/25         Patient will achieve passive range of motion right shoulder external rotation of 30 degrees in 45 degrees abd (Met)       Start:  02/07/25    Expected End:  03/21/25    Resolved:  03/26/25         Patient will achieve right shoulder flexion strength of at least 4+/5 (Progressing)       Start:  02/07/25    Expected End:  05/28/25            Patient will achieve  right shoulder abduction strength of at least 4+/5 (Progressing)       Start:  02/07/25    Expected End:  05/28/25            Patient will achieve right shoulder external rotation strength of at least 4+/5 in neutral (Progressing)       Start:  02/07/25    Expected End:  05/28/25            Patient will achieve right shoulder internal rotation strength of at least 4+/5 in neutral (Progressing)       Start:  02/07/25    Expected End:  05/28/25            Patient will achieve right elbow flexion strength of at least 4+/5 (Progressing)       Start:  02/07/25    Expected End:  05/28/25            Patient will achieve right elbow extension strength of at least 4+/5 (Progressing)       Start:  02/07/25    Expected End:  05/28/25            Patient will demonstrate independence in home program for support of progression (Met)       Start:  02/07/25    Expected End:  03/21/25    Resolved:  03/26/25         Patient will report pain of no more than 2/10 demonstrating a reduction of overall pain (Met)       Start:  02/07/25    Expected End:  05/28/25    Resolved:  04/29/25         Patient will show a significant change in Quick Dash (93.18 to 85.18) patient reported outcome tool to demonstrate subjective imporovement (Met)       Start:  02/07/25    Expected End:  03/21/25    Resolved:  03/26/25              Seth Huntley, PT

## 2025-05-02 DIAGNOSIS — L50.9 HIVES: ICD-10-CM

## 2025-05-02 RX ORDER — EPINEPHRINE 0.15 MG/.3ML
1 INJECTION INTRAMUSCULAR ONCE
Qty: 0.3 ML | Refills: 0 | Status: SHIPPED | OUTPATIENT
Start: 2025-05-02 | End: 2025-05-02

## 2025-05-11 DIAGNOSIS — I10 BENIGN HYPERTENSION: ICD-10-CM

## 2025-05-12 ENCOUNTER — APPOINTMENT (OUTPATIENT)
Dept: ORTHOPEDIC SURGERY | Facility: CLINIC | Age: 60
End: 2025-05-12
Payer: COMMERCIAL

## 2025-05-12 DIAGNOSIS — M75.101 TEAR OF RIGHT ROTATOR CUFF, UNSPECIFIED TEAR EXTENT, UNSPECIFIED WHETHER TRAUMATIC: Primary | ICD-10-CM

## 2025-05-12 PROCEDURE — 1036F TOBACCO NON-USER: CPT | Performed by: ORTHOPAEDIC SURGERY

## 2025-05-12 PROCEDURE — 99213 OFFICE O/P EST LOW 20 MIN: CPT | Performed by: ORTHOPAEDIC SURGERY

## 2025-05-12 ASSESSMENT — ENCOUNTER SYMPTOMS
FATIGUE: 0
SORE THROAT: 0
ARTHRALGIAS: 1
SINUS PAIN: 0
WOUND: 0
JOINT SWELLING: 0
FEVER: 0
SHORTNESS OF BREATH: 0
CHILLS: 0
WHEEZING: 0

## 2025-05-12 ASSESSMENT — PAIN - FUNCTIONAL ASSESSMENT: PAIN_FUNCTIONAL_ASSESSMENT: 0-10

## 2025-05-12 ASSESSMENT — PAIN SCALES - GENERAL: PAINLEVEL_OUTOF10: 2

## 2025-05-12 NOTE — PROGRESS NOTES
Reason for Appointment  Chief Complaint   Patient presents with    Right Shoulder - Post-op     History of Present Illness  Patient is a 59 y.o. male here today for follow-up evaluation 3-1/2 months status post a right shoulder arthroscopic rotator cuff repair and biceps tenodesis.  We again have gone slow with him due to the large tear.  He is doing well, still working in therapy and is ready to progress to gentle strengthening at this point.  Pain is improving in the shoulder.  No other changes in his past medical history, allergies, or medications.    Medical History[1]    Surgical History[2]    Medication Documentation Review Audit       Reviewed by Charles Gruber MD (Physician) on 03/17/25 at 2042      Medication Order Taking? Sig Documenting Provider Last Dose Status   amLODIPine (Norvasc) 10 mg tablet [id number]  Take 1 tablet (10 mg) by mouth once daily. No further refills until seen in office Marielle White MD  Active   atorvastatin (Lipitor) 40 mg tablet [id number] No Take 1 tablet (40 mg) by mouth once daily. Marielle White MD 2/5/2025 Morning Active   EPINEPHrine (Epipen-JR) 0.15 mg/0.3 mL injection syringe [id number]  Inject 0.3 mL (0.15 mg) as directed 1 time for 1 dose. Inject into upper leg. Call 911 after use. Dave Rothman PA-C  Active   losartan (Cozaar) 50 mg tablet [id number]  Take 1 tablet (50 mg) by mouth once daily. No further refills until seen in office Marielle White MD  Active                    RX Allergies[3]    Review of Systems   Constitutional:  Negative for chills, fatigue and fever.   HENT:  Negative for nosebleeds, sinus pain and sore throat.    Respiratory:  Negative for shortness of breath and wheezing.    Cardiovascular:  Negative for chest pain and leg swelling.   Musculoskeletal:  Positive for arthralgias. Negative for joint swelling.   Skin:  Negative for pallor and wound.     Exam   On exam the right shoulder shows well-healed scars.  He has good active forward  flexion over 130 degrees.  Deltoid is functional.  Good early cuff strength with external rotation.  Good pulses and sensation in the upper extremity.    Assessment   Encounter Diagnosis   Name Primary?    Tear of right rotator cuff, unspecified tear extent, unspecified whether traumatic Yes     Plan   He is doing well, he will begin strengthening at this point in again we are going slow and easy with him with the large tear and he understands not pushing strengthening and doing too much activity too quickly.  He is doing very well overall, he can follow-up with us in  2 months.    I, Lexi Pereira PA-C, am acting as a scribe and attest that this documentation has been prepared under the direction and in the presence of Charles Gruber MD.    By signing below, I, Charles Gruber MD, personally performed the services described in this documentation. All medical record entries made by the scribe were at my direction and in my presence. I have reviewed the chart and agree that the record reflects my personal performance and is accurate and complete.                     [1]   Past Medical History:  Diagnosis Date    Essential (primary) hypertension     Benign essential HTN    GERD (gastroesophageal reflux disease)     Hyperlipidemia    [2]   Past Surgical History:  Procedure Laterality Date    APPENDECTOMY      COLONOSCOPY      ORIF WRIST FRACTURE Left     OTHER SURGICAL HISTORY  05/22/2015    Wrist Surgery    TONSILLECTOMY  1969   [3]   Allergies  Allergen Reactions    Hornet Venom Hives     Per pt [place] hornet

## 2025-05-13 ENCOUNTER — TREATMENT (OUTPATIENT)
Dept: PHYSICAL THERAPY | Facility: HOSPITAL | Age: 60
End: 2025-05-13
Payer: COMMERCIAL

## 2025-05-13 DIAGNOSIS — M75.21 BICEPS TENDINITIS OF RIGHT UPPER EXTREMITY: ICD-10-CM

## 2025-05-13 DIAGNOSIS — M75.121 COMPLETE TEAR OF RIGHT ROTATOR CUFF, UNSPECIFIED WHETHER TRAUMATIC: Primary | ICD-10-CM

## 2025-05-13 PROCEDURE — 97110 THERAPEUTIC EXERCISES: CPT | Mod: GP | Performed by: PHYSICAL THERAPIST

## 2025-05-13 RX ORDER — AMLODIPINE BESYLATE 10 MG/1
10 TABLET ORAL DAILY
Qty: 90 TABLET | Refills: 0 | Status: SHIPPED | OUTPATIENT
Start: 2025-05-13

## 2025-05-13 RX ORDER — LOSARTAN POTASSIUM 50 MG/1
50 TABLET ORAL DAILY
Qty: 90 TABLET | Refills: 0 | Status: SHIPPED | OUTPATIENT
Start: 2025-05-13

## 2025-05-13 ASSESSMENT — PAIN SCALES - GENERAL: PAINLEVEL_OUTOF10: 2

## 2025-05-13 ASSESSMENT — PAIN - FUNCTIONAL ASSESSMENT: PAIN_FUNCTIONAL_ASSESSMENT: 0-10

## 2025-05-13 NOTE — PROGRESS NOTES
"  Physical Therapy Treatment    Patient Name: Av Francisco  MRN: [id number]  Today's Date: 5/13/2025  Time Calculation  Start Time: 0922  Stop Time: 1000  Time Calculation (min): 38 min        PT Therapeutic Procedures Time Entry  Therapeutic Exercise Time Entry: 38              Insurance:  Visit Limit: 20  Date Range: 5/28/2025  Co-Pay: $0    Current Problem  1. Complete tear of right rotator cuff, unspecified whether traumatic  Follow Up In Physical Therapy      2. Biceps tendinitis of right upper extremity  Follow Up In Physical Therapy        Problem List Items Addressed This Visit           ICD-10-CM    Biceps tendinitis of right upper extremity M75.21    Complete tear of right rotator cuff - Primary M75.121       General  Reason for Referral: s/p right shoulder rotator cuff with bicep tenodesis  Referred By: Dr. Gruber  Past Medical History Relevant to Rehab: benign HTN, biceps tendinitis of right upper extremity, deltoid tendinitis of right shoulder, complete tear of right rotator cuff.  Preferred Learning Style: visual, written    Subjective   Current Condition:   Better  Patient reports being cleared by Dr. Gruber for gentle strengthening.    Performing HEP?: Yes    Precautions \"progress to gentle strengthening at this point\"   Precautions  Post-Surgical Precautions: Right shoulder precautions  Precautions Comment: per Dr. Gruber's note on 5/12: \"progress to gentle strengthening at this point\"  Pain  Pain Assessment: 0-10  0-10 (Numeric) Pain Score: 2  Pain Location: Shoulder  Pain Orientation: Right    Objective     Treatments:  Therapeutic Exercise  Therapeutic Exercise Performed: Yes  Therapeutic Exercise Activity 2: standing shoulder flexion 2x10  Therapeutic Exercise Activity 5: standing shoulder abd 2x10  Therapeutic Exercise Activity 6: shoulder ext grn x15  Therapeutic Exercise Activity 7: scap retraction grn x15  Therapeutic Exercise Activity 8: reactive iso red x10  Therapeutic Exercise Activity " "9: salutes x20  Therapeutic Exercise Activity 10: bicep curls 2# x20  Therapeutic Exercise Activity 12: supine scap protraction 2# x10  Therapeutic Exercise Activity 13: rhythmic stabilization yellow ER 1x10\" 1x20\"  Therapeutic Exercise Activity 14: Sci Fit UBE lvl2 2' fwd/bwd  Therapeutic Exercise Activity 15: prone shoulder horizontal abd x10  Therapeutic Exercise Activity 16: doorway pec stretch 3x15\"         EDUCATION:   Individual(s) Educated: Patient  Education Provided: yes  Handout(s) Provided: Scanned into chart  Home Program: Access Code: [email or web address]  URL: [email or web address]  Date: 05/13/2025  Prepared by: Seth Huntley    Exercises  - Standing Bilateral Low Shoulder Row with Anchored Resistance  - 1-2 x daily - 10-20 reps  - Shoulder extension with resistance - Neutral  - 1-2 x daily - 10-20 reps  - Shoulder External Rotation Reactive Isometrics  - 1-2 x daily - 10-20 reps  - Seated Bicep Curls Supinated with Dumbbells  - 1-2 x daily - 10-20 reps  - Supine Scapular Protraction in Flexion with Dumbbells  - 1-2 x daily - 10-20 reps    Risk and Benefits Discussed with Patient/Caregiver/Other: Yes   Patient/Caregiver Demonstrated Understanding: Yes   Patient Response to Education: Patient/Caregiver verbalized understanding of information, Patient/Caregiver performed return demonstration of exercises/activities, and Patient/Caregiver asked appropriate questions    Assessment: Patient demonstrated good tolerance to progression of exercises without complaints of pain. Patient was issued an updated home exercise program (see above). Patient was issued a green exercise band for home exercise program.   PT Assessment  PT Assessment Results: Decreased strength, Decreased range of motion, Pain, Orthopedic restrictions  Rehab Prognosis: Good  Evaluation/Treatment Tolerance: Patient tolerated treatment well    Plan: Continue with POC. Progress strengthening as tolerated per protocol.  OP PT " Plan  Treatment/Interventions: Education/ Instruction, Manual therapy, Neuromuscular re-education, Therapeutic exercises, Therapeutic activities  PT Plan: Skilled PT  PT Frequency: 1 time per week  Duration: 9 weeks  Onset Date: 02/05/25  Certification Period Start Date: 02/07/25  Certification Period End Date: 05/28/25  Number of Treatments Authorized: 15 of 20  Rehab Potential: Good  Plan of Care Agreement: Patient    Goals:  Active       PT Problem       Patient will achieve passive range of motion right shoulder flexion of at least 160 degrees (Met)       Start:  02/07/25    Expected End:  05/28/25    Resolved:  04/29/25         Patient will achieve passive range of motion right shoulder abduction of at least 160 degrees (Met)       Start:  02/07/25    Expected End:  05/28/25    Resolved:  04/29/25         Patient will achieve passive range of motion right shoulder external rotation of 30 degrees in 45 degrees abd (Met)       Start:  02/07/25    Expected End:  03/21/25    Resolved:  03/26/25         Patient will achieve right shoulder flexion strength of at least 4+/5 (Progressing)       Start:  02/07/25    Expected End:  05/28/25            Patient will achieve right shoulder abduction strength of at least 4+/5 (Progressing)       Start:  02/07/25    Expected End:  05/28/25            Patient will achieve right shoulder external rotation strength of at least 4+/5 in neutral (Progressing)       Start:  02/07/25    Expected End:  05/28/25            Patient will achieve right shoulder internal rotation strength of at least 4+/5 in neutral (Progressing)       Start:  02/07/25    Expected End:  05/28/25            Patient will achieve right elbow flexion strength of at least 4+/5 (Progressing)       Start:  02/07/25    Expected End:  05/28/25            Patient will achieve right elbow extension strength of at least 4+/5 (Progressing)       Start:  02/07/25    Expected End:  05/28/25            Patient will  demonstrate independence in home program for support of progression (Met)       Start:  02/07/25    Expected End:  03/21/25    Resolved:  03/26/25         Patient will report pain of no more than 2/10 demonstrating a reduction of overall pain (Met)       Start:  02/07/25    Expected End:  05/28/25    Resolved:  04/29/25         Patient will show a significant change in Quick Dash (93.18 to 85.18) patient reported outcome tool to demonstrate subjective imporovement (Met)       Start:  02/07/25    Expected End:  03/21/25    Resolved:  03/26/25              Seth Huntley, PT

## 2025-05-21 ENCOUNTER — APPOINTMENT (OUTPATIENT)
Dept: PHYSICAL THERAPY | Facility: HOSPITAL | Age: 60
End: 2025-05-21
Payer: COMMERCIAL

## 2025-05-28 ENCOUNTER — TREATMENT (OUTPATIENT)
Dept: PHYSICAL THERAPY | Facility: HOSPITAL | Age: 60
End: 2025-05-28
Payer: COMMERCIAL

## 2025-05-28 DIAGNOSIS — M75.21 BICEPS TENDINITIS OF RIGHT UPPER EXTREMITY: ICD-10-CM

## 2025-05-28 DIAGNOSIS — M75.121 COMPLETE TEAR OF RIGHT ROTATOR CUFF, UNSPECIFIED WHETHER TRAUMATIC: ICD-10-CM

## 2025-05-28 PROCEDURE — 97110 THERAPEUTIC EXERCISES: CPT | Mod: GP,CQ

## 2025-05-28 ASSESSMENT — PAIN SCALES - GENERAL: PAINLEVEL_OUTOF10: 2

## 2025-05-28 ASSESSMENT — PAIN - FUNCTIONAL ASSESSMENT: PAIN_FUNCTIONAL_ASSESSMENT: 0-10

## 2025-05-28 NOTE — PROGRESS NOTES
"  Physical Therapy Treatment    Patient Name: Av Francisco  MRN: [id number]  Encounter Date: 5/28/2025  Time Calculation  Start Time: 1515  Stop Time: 1558  Time Calculation (min): 43 min        PT Therapeutic Procedures Time Entry  Therapeutic Exercise Time Entry: 43        Current Problem  Problem List Items Addressed This Visit           ICD-10-CM    Biceps tendinitis of right upper extremity M75.21    Complete tear of right rotator cuff M75.121     1. Complete tear of right rotator cuff, unspecified whether traumatic  Follow Up In Physical Therapy      2. Biceps tendinitis of right upper extremity  Follow Up In Physical Therapy          General  Reason for Referral: s/p right shoulder rotator cuff with bicep tenodesis  Referred By: Dr. Gruber  Past Medical History Relevant to Rehab: benign HTN, biceps tendinitis of right upper extremity, deltoid tendinitis of right shoulder, complete tear of right rotator cuff.  Preferred Learning Style: visual, written  General Comment: Reports feeling good overall, limiting certain activities at work still and feeling weak during reaching tasks.    Subjective   Current Condition:   Better  Patient reports feeling good overall, cont to have difficulty sleeping in certain positions.     Performing HEP?: Yes    Precautions  Precautions  Post-Surgical Precautions: Right shoulder precautions  Precautions Comment: per Dr. Gruber's note on 5/12: \"progress to gentle strengthening at this point\"  Pain  Pain Assessment: 0-10  0-10 (Numeric) Pain Score: 2  Pain Location: Shoulder  Pain Orientation: Right    Objective     Treatments:    Therapeutic Exercise  Therapeutic Exercise Performed: Yes  Therapeutic Exercise Activity 1: standing scaption #2 2x10  Therapeutic Exercise Activity 2: standing shoulder flexion 2x10  Therapeutic Exercise Activity 3: bent over shldr ext #4 2x10  Therapeutic Exercise Activity 5: standing shoulder abd 2x10  Therapeutic Exercise Activity 6: shoulder ext blue " "x20  Therapeutic Exercise Activity 7: 2x15 RTB standing ER BUE  Therapeutic Exercise Activity 8: reactive iso green x20  Therapeutic Exercise Activity 9: salutes x20  Therapeutic Exercise Activity 10: bicep curls 4# x20  Therapeutic Exercise Activity 12: supine scap protraction 2# x10  Therapeutic Exercise Activity 13: serratus anterior wall slides 20x  Therapeutic Exercise Activity 14: Sci Fit UBE lvl3  3' fwd/bwd  Therapeutic Exercise Activity 15: bent over shoulder horizontal abd 2x10 #3  Therapeutic Exercise Activity 16: doorway pec stretch 3x15\"    EDUCATION:   Individual(s) Educated: Patient  Education Provided: added reps and ER to HEP   Handout(s) Provided: Scanned into chart  Home Program: Access Code: [email or web address]  URL: [email or web address]  Date: 05/28/2025  Prepared by: Lorenza Irene    Exercises  - Supine Shoulder Flexion Extension Full Range AROM  - 1-2 x daily - 10 reps  - Standing Bilateral Low Shoulder Row with Anchored Resistance  - 1-2 x daily - 10-20 reps  - Shoulder extension with resistance - Neutral  - 1-2 x daily - 10-20 reps  - Shoulder External Rotation Reactive Isometrics  - 1-2 x daily - 10-20 reps  - Seated Bicep Curls Supinated with Dumbbells  - 1-2 x daily - 10-20 reps  - Supine Scapular Protraction in Flexion with Dumbbells  - 1-2 x daily - 10-20 reps  - Shoulder External Rotation and Scapular Retraction with Resistance  - 1 x daily - 7 x weekly - 3 sets - 10 reps  - Single Arm Bent Over Shoulder Horizontal Abduction with Dumbbell - Palm Down  - 1 x daily - 7 x weekly - 3 sets - 10 reps  - Single Arm Bent Over Shoulder Extension with Dumbbell  - 1 x daily - 7 x weekly - 3 sets - 10 reps  - Doorway Pec Stretch at 60 Elevation  - 1 x daily - 7 x weekly - 3 sets - 10 reps  - Standing Shoulder Flexion Full Range  - 1 x daily - 7 x weekly - 3 sets - 10 reps  - Scaption with Dumbbells  - 1 x daily - 7 x weekly - 3 sets - 10 reps    Risk and Benefits Discussed with " Patient/Caregiver/Other: Yes   Patient/Caregiver Demonstrated Understanding: Yes   Patient Response to Education: Patient/Caregiver verbalized understanding of information, Patient/Caregiver performed return demonstration of exercises/activities, and Patient/Caregiver asked appropriate questions    Assessment: Pt able to perform and tolerate increased reps and resistance to new standing and posterior chain specific strength tasks with posture cues x 2 and reduced shoulder hiking compensations x 1.  Pt able to improve standing shoulder flexion and scaption rom to almost full ROM without pain or compensations.    PT Assessment  PT Assessment Results: Decreased strength, Decreased range of motion, Pain, Orthopedic restrictions  Rehab Prognosis: Good    Plan: Continue with POC. Continue with core stability, UE strengthening, ROM, flexibility, and scapular stability, so the patient can perform FA's without increased pain or difficulty.    OP PT Plan  Treatment/Interventions: Education/ Instruction, Manual therapy, Neuromuscular re-education, Therapeutic exercises, Therapeutic activities  PT Plan: Skilled PT  PT Frequency: 1 time per week  Duration: 9 weeks  Onset Date: 02/05/25  Certification Period Start Date: 02/07/25  Certification Period End Date: 05/28/25  Number of Treatments Authorized: 15 of 20  Rehab Potential: Good  Plan of Care Agreement: Patient  Payor: [employer] [facility] / Plan: [employer] / Product Type: *No Product type* /     Visit count this episode: 16 visits    Goals:  Active       PT Problem       Patient will achieve passive range of motion right shoulder flexion of at least 160 degrees (Met)       Start:  02/07/25    Expected End:  05/28/25    Resolved:  04/29/25         Patient will achieve passive range of motion right shoulder abduction of at least 160 degrees (Met)       Start:  02/07/25    Expected End:  05/28/25    Resolved:  04/29/25         Patient will achieve passive  range of motion right shoulder external rotation of 30 degrees in 45 degrees abd (Met)       Start:  02/07/25    Expected End:  03/21/25    Resolved:  03/26/25         Patient will achieve right shoulder flexion strength of at least 4+/5 (Progressing)       Start:  02/07/25    Expected End:  05/28/25            Patient will achieve right shoulder abduction strength of at least 4+/5 (Progressing)       Start:  02/07/25    Expected End:  05/28/25            Patient will achieve right shoulder external rotation strength of at least 4+/5 in neutral (Progressing)       Start:  02/07/25    Expected End:  05/28/25            Patient will achieve right shoulder internal rotation strength of at least 4+/5 in neutral (Progressing)       Start:  02/07/25    Expected End:  05/28/25            Patient will achieve right elbow flexion strength of at least 4+/5 (Progressing)       Start:  02/07/25    Expected End:  05/28/25            Patient will achieve right elbow extension strength of at least 4+/5 (Progressing)       Start:  02/07/25    Expected End:  05/28/25            Patient will demonstrate independence in home program for support of progression (Met)       Start:  02/07/25    Expected End:  03/21/25    Resolved:  03/26/25         Patient will report pain of no more than 2/10 demonstrating a reduction of overall pain (Met)       Start:  02/07/25    Expected End:  05/28/25    Resolved:  04/29/25         Patient will show a significant change in Quick Dash (93.18 to 85.18) patient reported outcome tool to demonstrate subjective imporovement (Met)       Start:  02/07/25    Expected End:  03/21/25    Resolved:  03/26/25              Lorenza Irene PTA

## 2025-05-30 DIAGNOSIS — E78.00 HYPERCHOLESTEROLEMIA: ICD-10-CM

## 2025-05-30 RX ORDER — ATORVASTATIN CALCIUM 40 MG/1
40 TABLET, FILM COATED ORAL DAILY
Qty: 90 TABLET | Refills: 0 | Status: SHIPPED | OUTPATIENT
Start: 2025-05-30

## 2025-06-11 ENCOUNTER — TREATMENT (OUTPATIENT)
Dept: PHYSICAL THERAPY | Facility: HOSPITAL | Age: 60
End: 2025-06-11
Payer: COMMERCIAL

## 2025-06-11 DIAGNOSIS — M75.121 COMPLETE TEAR OF RIGHT ROTATOR CUFF, UNSPECIFIED WHETHER TRAUMATIC: Primary | ICD-10-CM

## 2025-06-11 DIAGNOSIS — M75.21 BICEPS TENDINITIS OF RIGHT UPPER EXTREMITY: ICD-10-CM

## 2025-06-11 PROCEDURE — 97110 THERAPEUTIC EXERCISES: CPT | Mod: GP | Performed by: PHYSICAL THERAPIST

## 2025-06-11 ASSESSMENT — PAIN SCALES - GENERAL: PAINLEVEL_OUTOF10: 0 - NO PAIN

## 2025-06-11 ASSESSMENT — PAIN - FUNCTIONAL ASSESSMENT: PAIN_FUNCTIONAL_ASSESSMENT: 0-10

## 2025-06-11 NOTE — PROGRESS NOTES
"  Physical Therapy Treatment and Discharge     Patient Name: Av Francisco  MRN: [id number]  Today's Date: 6/11/2025  Time Calculation  Start Time: 1503  Stop Time: 1546  Time Calculation (min): 43 min        PT Therapeutic Procedures Time Entry  Therapeutic Exercise Time Entry: 43       Insurance:  Visit Limit: 20  Date Range: no auth  Co-Pay: $0    Current Problem  1. Complete tear of right rotator cuff, unspecified whether traumatic  Follow Up In Physical Therapy      2. Biceps tendinitis of right upper extremity  Follow Up In Physical Therapy        Problem List Items Addressed This Visit           ICD-10-CM    Biceps tendinitis of right upper extremity M75.21    Complete tear of right rotator cuff - Primary M75.121       General  Reason for Referral: s/p right shoulder rotator cuff with bicep tenodesis  Referred By: Dr. Gruber  Past Medical History Relevant to Rehab: benign HTN, biceps tendinitis of right upper extremity, deltoid tendinitis of right shoulder, complete tear of right rotator cuff.  Preferred Learning Style: visual, written    Subjective   Current Condition:   Better  Patient reports feeling good. Patient states that he is able to work and his shoulder is not bothering him. Patient states that he is still avoiding lifting heavy objects.    Performing HEP?: Yes    Precautions  Precautions  Post-Surgical Precautions: Right shoulder precautions  Precautions Comment: per Dr. Gruber's note on 5/12: \"progress to gentle strengthening at this point\"  Pain  Pain Assessment: 0-10  0-10 (Numeric) Pain Score: 0 - No pain    Objective   Shoulder    Functional Rating Scale  Quick Dash: 13    Shoulder AROM  R Shoulder flexion: (180°): 155  R shoulder abduction: (180°): 158  R Shoulder ER: (90°): 76 @50 abd  L shoulder IR: (70°): L5    Shoulder Strength  R shoulder flexion: (5/5): 4+/5  L shoulder flexion: (5/5): 5/5  R shoulder abduction: (5/5): 4+/5  L shoulder abduction: (5/5): 5/5  R shoulder ER: (5/5): " 4+/5  L shoulder ER: (5/5): 5/5  R shoulder IR: (5/5) : 4+/5  L shoulder IR: (5/5): 5/5    Elbow AROM  Elbow AROM WFL: yes    Elbow Strength  R elbow flexion: (5/5): 5/5  L elbow flexion: (5/5): 5/5  R elbow extension: (5/5): 5/5  L elbow extension: (5/5): 5/5    Outcome Measures:  Other Measures  Disability of Arm Shoulder Hand (DASH): 13    Treatments:  Therapeutic Exercise  Therapeutic Exercise Performed: Yes  Therapeutic Exercise Activity 7: 2x15 blue standing ER  Therapeutic Exercise Activity 10: bicep curls 5# x30  Therapeutic Exercise Activity 12: supine scap protraction 4# x20  Therapeutic Exercise Activity 13: serratus anterior wall slides 20x  Therapeutic Exercise Activity 14: Sci Fit UBE lvl3  3' fwd/bwd         EDUCATION:   Individual(s) Educated: Patient  Education Provided: yes  Handout(s) Provided: Scanned into chart  Home Program: Access Code: [email or web address]  URL: [email or web address]  Date: 06/11/2025  Prepared by: Seth Huntley    Exercises  - Standing Shoulder Internal Rotation Stretch with Towel  - 1 x daily - 10 reps  Risk and Benefits Discussed with Patient/Caregiver/Other: Yes   Patient/Caregiver Demonstrated Understanding: Yes   Patient Response to Education: Patient/Caregiver verbalized understanding of information, Patient/Caregiver performed return demonstration of exercises/activities, and Patient/Caregiver asked appropriate questions    Assessment: Today's session focused on strength and ROM. Patient demonstrated good tolerance to session this date. They are demonstrating good progress in skilled rehabilitation at this time and has met all of his physical therapy goals. Patient agrees with plan. Patient is appropriate to discharge from physical therapy at this time. Updated HEP to reflect today's session. All questions answered.         PT Assessment  Rehab Prognosis: Good  Evaluation/Treatment Tolerance: Patient tolerated treatment well    Plan: Continue with POC.  Discharge physical therapy.  OP PT Plan  Treatment/Interventions: Education/ Instruction, Manual therapy, Neuromuscular re-education, Therapeutic exercises, Therapeutic activities  PT Plan: No Additional PT interventions required at this time  Onset Date: 02/05/25  Certification Period Start Date: 02/07/25  Certification Period End Date: 05/28/25  Number of Treatments Authorized: 16 of 20  Rehab Potential: Good  Plan of Care Agreement: Patient    Goals:  Resolved       PT Problem       Patient will achieve passive range of motion right shoulder flexion of at least 160 degrees (Met)       Start:  02/07/25    Expected End:  05/28/25    Resolved:  04/29/25         Patient will achieve passive range of motion right shoulder abduction of at least 160 degrees (Met)       Start:  02/07/25    Expected End:  05/28/25    Resolved:  04/29/25         Patient will achieve passive range of motion right shoulder external rotation of 30 degrees in 45 degrees abd (Met)       Start:  02/07/25    Expected End:  03/21/25    Resolved:  03/26/25         Patient will achieve right shoulder flexion strength of at least 4+/5 (Met)       Start:  02/07/25    Expected End:  05/28/25    Resolved:  06/11/25         Patient will achieve right shoulder abduction strength of at least 4+/5 (Met)       Start:  02/07/25    Expected End:  05/28/25    Resolved:  06/11/25         Patient will achieve right shoulder external rotation strength of at least 4+/5 in neutral (Met)       Start:  02/07/25    Expected End:  05/28/25    Resolved:  06/11/25         Patient will achieve right shoulder internal rotation strength of at least 4+/5 in neutral (Met)       Start:  02/07/25    Expected End:  05/28/25    Resolved:  06/11/25         Patient will achieve right elbow flexion strength of at least 4+/5 (Met)       Start:  02/07/25    Expected End:  05/28/25    Resolved:  06/11/25         Patient will achieve right elbow extension strength of at least 4+/5 (Met)        Start:  02/07/25    Expected End:  05/28/25    Resolved:  06/11/25         Patient will demonstrate independence in home program for support of progression (Met)       Start:  02/07/25    Expected End:  03/21/25    Resolved:  03/26/25         Patient will report pain of no more than 2/10 demonstrating a reduction of overall pain (Met)       Start:  02/07/25    Expected End:  05/28/25    Resolved:  04/29/25         Patient will show a significant change in Quick Dash (93.18 to 85.18) patient reported outcome tool to demonstrate subjective imporovement (Met)       Start:  02/07/25    Expected End:  03/21/25    Resolved:  03/26/25              Seth Huntley, PT

## 2025-07-14 ENCOUNTER — APPOINTMENT (OUTPATIENT)
Dept: ORTHOPEDIC SURGERY | Facility: CLINIC | Age: 60
End: 2025-07-14
Payer: COMMERCIAL

## 2025-07-23 ENCOUNTER — APPOINTMENT (OUTPATIENT)
Dept: PRIMARY CARE | Facility: CLINIC | Age: 60
End: 2025-07-23
Payer: COMMERCIAL

## 2025-07-23 ENCOUNTER — HOSPITAL ENCOUNTER (OUTPATIENT)
Dept: RADIOLOGY | Facility: HOSPITAL | Age: 60
Discharge: HOME | End: 2025-07-23
Payer: COMMERCIAL

## 2025-07-23 VITALS
HEART RATE: 68 BPM | TEMPERATURE: 97.4 F | OXYGEN SATURATION: 97 % | SYSTOLIC BLOOD PRESSURE: 110 MMHG | BODY MASS INDEX: 27.96 KG/M2 | HEIGHT: 73 IN | WEIGHT: 211 LBS | DIASTOLIC BLOOD PRESSURE: 78 MMHG

## 2025-07-23 DIAGNOSIS — E78.00 HYPERCHOLESTEROLEMIA: ICD-10-CM

## 2025-07-23 DIAGNOSIS — Z12.11 SCREEN FOR COLON CANCER: Primary | ICD-10-CM

## 2025-07-23 DIAGNOSIS — I10 BENIGN HYPERTENSION: ICD-10-CM

## 2025-07-23 DIAGNOSIS — M17.11 ARTHRITIS OF RIGHT KNEE: ICD-10-CM

## 2025-07-23 DIAGNOSIS — Z00.00 ANNUAL PHYSICAL EXAM: ICD-10-CM

## 2025-07-23 DIAGNOSIS — K21.00 GASTROESOPHAGEAL REFLUX DISEASE WITH ESOPHAGITIS WITHOUT HEMORRHAGE: ICD-10-CM

## 2025-07-23 PROCEDURE — 1036F TOBACCO NON-USER: CPT | Performed by: INTERNAL MEDICINE

## 2025-07-23 PROCEDURE — 99396 PREV VISIT EST AGE 40-64: CPT | Performed by: INTERNAL MEDICINE

## 2025-07-23 PROCEDURE — 3078F DIAST BP <80 MM HG: CPT | Performed by: INTERNAL MEDICINE

## 2025-07-23 PROCEDURE — 3008F BODY MASS INDEX DOCD: CPT | Performed by: INTERNAL MEDICINE

## 2025-07-23 PROCEDURE — 73562 X-RAY EXAM OF KNEE 3: CPT | Mod: RT

## 2025-07-23 PROCEDURE — 3074F SYST BP LT 130 MM HG: CPT | Performed by: INTERNAL MEDICINE

## 2025-07-23 PROCEDURE — 73562 X-RAY EXAM OF KNEE 3: CPT | Mod: RIGHT SIDE | Performed by: RADIOLOGY

## 2025-07-23 PROCEDURE — 99214 OFFICE O/P EST MOD 30 MIN: CPT | Performed by: INTERNAL MEDICINE

## 2025-07-23 RX ORDER — PANTOPRAZOLE SODIUM 40 MG/1
40 TABLET, DELAYED RELEASE ORAL DAILY
Qty: 30 TABLET | Refills: 1 | Status: SHIPPED | OUTPATIENT
Start: 2025-07-23 | End: 2025-09-21

## 2025-07-23 RX ORDER — ATORVASTATIN CALCIUM 40 MG/1
40 TABLET, FILM COATED ORAL DAILY
Qty: 90 TABLET | Refills: 0 | Status: SHIPPED | OUTPATIENT
Start: 2025-07-23

## 2025-07-23 RX ORDER — LOSARTAN POTASSIUM 50 MG/1
50 TABLET ORAL DAILY
Qty: 90 TABLET | Refills: 0 | Status: SHIPPED | OUTPATIENT
Start: 2025-07-23

## 2025-07-23 RX ORDER — AMLODIPINE BESYLATE 10 MG/1
10 TABLET ORAL DAILY
Qty: 90 TABLET | Refills: 0 | Status: SHIPPED | OUTPATIENT
Start: 2025-07-23

## 2025-07-23 ASSESSMENT — ENCOUNTER SYMPTOMS
WHEEZING: 0
FEVER: 0
SINUS PAIN: 0
DIARRHEA: 0
DIFFICULTY URINATING: 0
BRUISES/BLEEDS EASILY: 0
PALPITATIONS: 0
SORE THROAT: 0
HEADACHES: 0
FATIGUE: 0
ABDOMINAL PAIN: 0
ARTHRALGIAS: 1
DIZZINESS: 0
BLOOD IN STOOL: 0
UNEXPECTED WEIGHT CHANGE: 0
COUGH: 0

## 2025-07-23 ASSESSMENT — ANXIETY QUESTIONNAIRES
2. NOT BEING ABLE TO STOP OR CONTROL WORRYING: NOT AT ALL
3. WORRYING TOO MUCH ABOUT DIFFERENT THINGS: NOT AT ALL
1. FEELING NERVOUS, ANXIOUS, OR ON EDGE: NOT AT ALL
7. FEELING AFRAID AS IF SOMETHING AWFUL MIGHT HAPPEN: NOT AT ALL
6. BECOMING EASILY ANNOYED OR IRRITABLE: NOT AT ALL
4. TROUBLE RELAXING: NOT AT ALL
5. BEING SO RESTLESS THAT IT IS HARD TO SIT STILL: NOT AT ALL
GAD7 TOTAL SCORE: 0

## 2025-07-23 ASSESSMENT — PATIENT HEALTH QUESTIONNAIRE - PHQ9
1. LITTLE INTEREST OR PLEASURE IN DOING THINGS: NOT AT ALL
SUM OF ALL RESPONSES TO PHQ9 QUESTIONS 1 AND 2: 0
2. FEELING DOWN, DEPRESSED OR HOPELESS: NOT AT ALL

## 2025-07-23 NOTE — PROGRESS NOTES
"Subjective   Patient ID: Av Francisco is a 60 y.o. male who presents for Annual Exam.    Annual preventive visit  - Vaccinations reviewed and up-to-date  - Screen for colon cancer obtained in 2015 repeat in 10 years 2025, order placed today  - Screen for depression negative-Bilateral ear fullness examination bilateral otitis externa  -Cardiac calcium scoring mildly calcium. Maximize medical management.  - Vaccination reviewed patient need to proceed with Tdap     Follow-up  - Right knee pain and swelling arthritis continues Tylenol as needed obtain x-ray follow-up with us closely may need steroid injection if no improvement patient wants to have it before leaving town we will follow-up results closely  - Need to proceed with complete blood work  -Tetanus vaccine needed  - Recent blood work reviewed with patient controlled continue low-fat diet exercise and weight loss  - Status post rotator cuff tear repair by Dr. Gruber slowly improving  --\"Reflux disease continue with conservative measures continue current treatment  -Hypertension controlled continue current medication  - Hypercholesteremia controlled continue current medication continue low-fat diet   -Continue exercise and weight loss  Follow-up 3 months             Review of Systems   Constitutional:  Negative for fatigue, fever and unexpected weight change.   HENT:  Negative for congestion, ear discharge, ear pain, mouth sores, sinus pain and sore throat.    Eyes:  Negative for visual disturbance.   Respiratory:  Negative for cough and wheezing.    Cardiovascular:  Negative for chest pain, palpitations and leg swelling.   Gastrointestinal:  Negative for abdominal pain, blood in stool and diarrhea.   Genitourinary:  Negative for difficulty urinating.   Musculoskeletal:  Positive for arthralgias.   Skin:  Negative for rash.   Neurological:  Negative for dizziness and headaches.   Hematological:  Does not bruise/bleed easily.   Psychiatric/Behavioral:  Negative " "for behavioral problems.    All other systems reviewed and are negative.      Objective   No results found for: \"HGBA1C\"   /78   Pulse 68   Temp 36.3 °C (97.4 °F)   Ht 1.854 m (6' 1\")   Wt 95.7 kg (211 lb)   SpO2 97%   BMI 27.84 kg/m²     Physical Exam  Vitals and nursing note reviewed.   Constitutional:       Appearance: Normal appearance.   HENT:      Head: Normocephalic.      Nose: Nose normal.     Eyes:      Conjunctiva/sclera: Conjunctivae normal.      Pupils: Pupils are equal, round, and reactive to light.       Cardiovascular:      Rate and Rhythm: Regular rhythm.   Pulmonary:      Effort: Pulmonary effort is normal.      Breath sounds: Normal breath sounds.   Abdominal:      General: Abdomen is flat.      Palpations: Abdomen is soft.     Musculoskeletal:         General: Tenderness (Right knee tenderness) present.      Cervical back: Neck supple.     Skin:     General: Skin is warm.     Neurological:      General: No focal deficit present.      Mental Status: He is oriented to person, place, and time.     Psychiatric:         Mood and Affect: Mood normal.       Lab Results   Component Value Date    WBC 8.1 01/23/2025    HGB 15.3 01/23/2025    HCT 45.5 01/23/2025     01/23/2025    CHOL 168 07/18/2024    TRIG 121 07/18/2024    HDL 52.2 07/18/2024    ALT 26 07/18/2024    AST 17 07/18/2024     01/23/2025    K 3.9 01/23/2025     (H) 01/23/2025    CREATININE 0.80 01/23/2025    BUN 15 01/23/2025    CO2 23 01/23/2025    TSH 1.09 07/18/2024    PSA 0.83 07/18/2024     par: Hemoglobin  Assessment/Plan   Av was seen today for annual exam.  Diagnoses and all orders for this visit:  Screen for colon cancer (Primary)  -     Colonoscopy Screening; Average Risk Patient; Future  Annual physical exam  -     CBC and Auto Differential; Future  -     Comprehensive Metabolic Panel; Future  -     Hemoglobin A1C; Future  -     Lipid Panel; Future  -     Magnesium; Future  -     Prostate Specific " "Antigen; Future  -     TSH with reflex to Free T4 if abnormal; Future  -     Colonoscopy Screening; Average Risk Patient; Future  -     CBC and Auto Differential  -     Comprehensive Metabolic Panel  -     Hemoglobin A1C  -     Lipid Panel  -     Magnesium  -     Prostate Specific Antigen  -     TSH with reflex to Free T4 if abnormal  Arthritis of right knee  -     XR knee right 1-2 views; Future  Gastroesophageal reflux disease with esophagitis without hemorrhage  -     pantoprazole (ProtoNix) 40 mg EC tablet; Take 1 tablet (40 mg) by mouth once daily. Do not crush, chew, or split.   Annual preventive visit  - Vaccinations reviewed and up-to-date  - Screen for colon cancer obtained in 2015 repeat in 10 years 2025, order placed today  - Screen for depression negative-Bilateral ear fullness examination bilateral otitis externa  -Cardiac calcium scoring mildly calcium. Maximize medical management.  - Vaccination reviewed patient need to proceed with Tdap     Follow-up  - Right knee pain and swelling arthritis continues Tylenol as needed obtain x-ray follow-up with us closely may need steroid injection if no improvement patient wants to have it before leaving town we will follow-up results closely  - Need to proceed with complete blood work  -Tetanus vaccine needed  - Recent blood work reviewed with patient controlled continue low-fat diet exercise and weight loss  - Status post rotator cuff tear repair by Dr. Gruber slowly improving  --\"Reflux disease continue with conservative measures continue current treatment  -Hypertension controlled continue current medication  - Hypercholesteremia controlled continue current medication continue low-fat diet   -Continue exercise and weight loss  Follow-up 3 months    "

## 2025-08-11 ENCOUNTER — APPOINTMENT (OUTPATIENT)
Dept: ORTHOPEDIC SURGERY | Facility: CLINIC | Age: 60
End: 2025-08-11
Payer: COMMERCIAL

## 2025-10-27 ENCOUNTER — APPOINTMENT (OUTPATIENT)
Dept: PRIMARY CARE | Facility: CLINIC | Age: 60
End: 2025-10-27
Payer: COMMERCIAL

## (undated) DEVICE — SUTURE, MONOCRYL, 3-0, 27 IN, PS-2, UNDYED

## (undated) DEVICE — DRESSING, GAUZE, SPONGE, KERLIX, SUPER, 6 X 6.75 IN, STERILE 10PK

## (undated) DEVICE — ARTHREX LATERAL TRACTION ARM SLEEVE (TO BE USED W/AR-1630 OR AR-1600M)

## (undated) DEVICE — TUBING, SUCTION, 6MM X 10, CLEAN N-COND

## (undated) DEVICE — TUBING, PATIENT 8FT STERILE

## (undated) DEVICE — NEEDLE, ELECTRODE, ELECTROSURGICAL, INSULATED

## (undated) DEVICE — BLANKET, LOWER BODY, VHA PLUS, ADULT

## (undated) DEVICE — Device

## (undated) DEVICE — CANNULA, BUTTON, PASSPORT, 8MM X 4CM

## (undated) DEVICE — DRESSING, ABDOMINAL, WET PRUF, TENDERSORB, 5 X 9 IN, STERILE

## (undated) DEVICE — PROBE, APOLLO RF, 90 DEG, EXTRA LARTGE

## (undated) DEVICE — SUTURE, PROLENE, 3-0, 18 IN, PS2, BLUE

## (undated) DEVICE — DRESSING, TRANSPARENT, TEGADERM, FRAME STYLE, 4 X 4.5, STRL

## (undated) DEVICE — TUBING, PUMP REDEUCE 8FT STERILE

## (undated) DEVICE — GLOVE, SURGICAL, PROTEXIS PI BLUE W/NEUTHERA, 6.5, PF, LF

## (undated) DEVICE — GLOVE, PROTEXIS PI CLASSIC, SZ-6.5, PF, LF

## (undated) DEVICE — BANDAGE, ELASTIC, MATRIX, SELF-CLOSURE, 6 IN X 5 YD, LF

## (undated) DEVICE — KIT, DISPOSABLES, FOR 2.6 FIBERTAK

## (undated) DEVICE — GOWN, SURGICAL, SIRUS, NON REINFORCED, LARGE

## (undated) DEVICE — GLOVE, SURGICAL, PROTEXIS PI , 7.5, PF, LF